# Patient Record
Sex: FEMALE | Race: WHITE | NOT HISPANIC OR LATINO | Employment: OTHER | URBAN - METROPOLITAN AREA
[De-identification: names, ages, dates, MRNs, and addresses within clinical notes are randomized per-mention and may not be internally consistent; named-entity substitution may affect disease eponyms.]

---

## 2019-09-14 ENCOUNTER — ANESTHESIA (EMERGENCY)
Dept: PERIOP | Facility: HOSPITAL | Age: 54
DRG: 661 | End: 2019-09-14
Payer: COMMERCIAL

## 2019-09-14 ENCOUNTER — APPOINTMENT (EMERGENCY)
Dept: CT IMAGING | Facility: HOSPITAL | Age: 54
DRG: 661 | End: 2019-09-14
Payer: COMMERCIAL

## 2019-09-14 ENCOUNTER — ANESTHESIA EVENT (EMERGENCY)
Dept: PERIOP | Facility: HOSPITAL | Age: 54
DRG: 661 | End: 2019-09-14
Payer: COMMERCIAL

## 2019-09-14 ENCOUNTER — HOSPITAL ENCOUNTER (INPATIENT)
Facility: HOSPITAL | Age: 54
LOS: 2 days | Discharge: HOME | DRG: 661 | End: 2019-09-16
Attending: EMERGENCY MEDICINE | Admitting: STUDENT IN AN ORGANIZED HEALTH CARE EDUCATION/TRAINING PROGRAM
Payer: COMMERCIAL

## 2019-09-14 ENCOUNTER — APPOINTMENT (EMERGENCY)
Dept: RADIOLOGY | Facility: HOSPITAL | Age: 54
DRG: 661 | End: 2019-09-14
Payer: COMMERCIAL

## 2019-09-14 ENCOUNTER — TELEPHONE (OUTPATIENT)
Dept: UROLOGY | Facility: CLINIC | Age: 54
End: 2019-09-14

## 2019-09-14 DIAGNOSIS — N20.1 URETEROLITHIASIS: ICD-10-CM

## 2019-09-14 DIAGNOSIS — N12 PYELONEPHRITIS: Primary | ICD-10-CM

## 2019-09-14 PROBLEM — I48.91 ATRIAL FIBRILLATION (HCC): Status: ACTIVE | Noted: 2019-09-14

## 2019-09-14 PROBLEM — N13.2 HYDRONEPHROSIS CONCURRENT WITH AND DUE TO CALCULI OF KIDNEY AND URETER: Status: ACTIVE | Noted: 2019-09-14

## 2019-09-14 PROBLEM — I10 HYPERTENSION: Status: ACTIVE | Noted: 2019-09-14

## 2019-09-14 PROBLEM — I50.9 CHF (CONGESTIVE HEART FAILURE) (HCC): Status: ACTIVE | Noted: 2019-09-14

## 2019-09-14 PROBLEM — E11.9 TYPE 2 DIABETES MELLITUS, WITHOUT LONG-TERM CURRENT USE OF INSULIN (HCC): Status: ACTIVE | Noted: 2019-09-14

## 2019-09-14 LAB
ALBUMIN SERPL BCP-MCNC: 3 G/DL (ref 3.5–5)
ALP SERPL-CCNC: 54 U/L (ref 46–116)
ALT SERPL W P-5'-P-CCNC: 15 U/L (ref 12–78)
ANION GAP SERPL CALCULATED.3IONS-SCNC: 11 MMOL/L (ref 4–13)
ANION GAP SERPL CALCULATED.3IONS-SCNC: 8 MMOL/L (ref 4–13)
ANION GAP SERPL CALCULATED.3IONS-SCNC: 9 MMOL/L (ref 4–13)
APTT PPP: 24 SECONDS (ref 23–37)
AST SERPL W P-5'-P-CCNC: 10 U/L (ref 5–45)
BACTERIA UR QL AUTO: ABNORMAL /HPF
BASOPHILS # BLD AUTO: 0.02 THOUSANDS/ΜL (ref 0–0.1)
BASOPHILS NFR BLD AUTO: 0 % (ref 0–1)
BILIRUB SERPL-MCNC: 0.7 MG/DL (ref 0.2–1)
BILIRUB UR QL STRIP: ABNORMAL
BUN SERPL-MCNC: 11 MG/DL (ref 5–25)
BUN SERPL-MCNC: 12 MG/DL (ref 5–25)
BUN SERPL-MCNC: 15 MG/DL (ref 5–25)
CALCIUM SERPL-MCNC: 8.6 MG/DL (ref 8.3–10.1)
CALCIUM SERPL-MCNC: 8.8 MG/DL (ref 8.3–10.1)
CALCIUM SERPL-MCNC: 9.3 MG/DL (ref 8.3–10.1)
CHLORIDE SERPL-SCNC: 100 MMOL/L (ref 100–108)
CHLORIDE SERPL-SCNC: 101 MMOL/L (ref 100–108)
CHLORIDE SERPL-SCNC: 96 MMOL/L (ref 100–108)
CLARITY UR: ABNORMAL
CO2 SERPL-SCNC: 26 MMOL/L (ref 21–32)
CO2 SERPL-SCNC: 27 MMOL/L (ref 21–32)
CO2 SERPL-SCNC: 28 MMOL/L (ref 21–32)
COLOR UR: YELLOW
CREAT SERPL-MCNC: 0.92 MG/DL (ref 0.6–1.3)
CREAT SERPL-MCNC: 1.09 MG/DL (ref 0.6–1.3)
CREAT SERPL-MCNC: 1.21 MG/DL (ref 0.6–1.3)
EOSINOPHIL # BLD AUTO: 0.18 THOUSAND/ΜL (ref 0–0.61)
EOSINOPHIL NFR BLD AUTO: 2 % (ref 0–6)
ERYTHROCYTE [DISTWIDTH] IN BLOOD BY AUTOMATED COUNT: 12.3 % (ref 11.6–15.1)
GFR SERPL CREATININE-BSD FRML MDRD: 50 ML/MIN/1.73SQ M
GFR SERPL CREATININE-BSD FRML MDRD: 57 ML/MIN/1.73SQ M
GFR SERPL CREATININE-BSD FRML MDRD: 70 ML/MIN/1.73SQ M
GLUCOSE SERPL-MCNC: 108 MG/DL (ref 65–140)
GLUCOSE SERPL-MCNC: 156 MG/DL (ref 65–140)
GLUCOSE SERPL-MCNC: 163 MG/DL (ref 65–140)
GLUCOSE SERPL-MCNC: 165 MG/DL (ref 65–140)
GLUCOSE SERPL-MCNC: 174 MG/DL (ref 65–140)
GLUCOSE SERPL-MCNC: 93 MG/DL (ref 65–140)
GLUCOSE SERPL-MCNC: 98 MG/DL (ref 65–140)
GLUCOSE UR STRIP-MCNC: NEGATIVE MG/DL
HCT VFR BLD AUTO: 41.3 % (ref 34.8–46.1)
HGB BLD-MCNC: 13.8 G/DL (ref 11.5–15.4)
HGB UR QL STRIP.AUTO: ABNORMAL
IMM GRANULOCYTES # BLD AUTO: 0.03 THOUSAND/UL (ref 0–0.2)
IMM GRANULOCYTES NFR BLD AUTO: 0 % (ref 0–2)
INR PPP: 1.13 (ref 0.84–1.19)
KETONES UR STRIP-MCNC: NEGATIVE MG/DL
LACTATE SERPL-SCNC: 1.7 MMOL/L (ref 0.5–2)
LEUKOCYTE ESTERASE UR QL STRIP: ABNORMAL
LYMPHOCYTES # BLD AUTO: 0.68 THOUSANDS/ΜL (ref 0.6–4.47)
LYMPHOCYTES NFR BLD AUTO: 7 % (ref 14–44)
MCH RBC QN AUTO: 30.7 PG (ref 26.8–34.3)
MCHC RBC AUTO-ENTMCNC: 33.4 G/DL (ref 31.4–37.4)
MCV RBC AUTO: 92 FL (ref 82–98)
MONOCYTES # BLD AUTO: 0.43 THOUSAND/ΜL (ref 0.17–1.22)
MONOCYTES NFR BLD AUTO: 4 % (ref 4–12)
NEUTROPHILS # BLD AUTO: 8.35 THOUSANDS/ΜL (ref 1.85–7.62)
NEUTS SEG NFR BLD AUTO: 87 % (ref 43–75)
NITRITE UR QL STRIP: NEGATIVE
NON-SQ EPI CELLS URNS QL MICRO: ABNORMAL /HPF
NRBC BLD AUTO-RTO: 0 /100 WBCS
PH UR STRIP.AUTO: 6 [PH]
PLATELET # BLD AUTO: 190 THOUSANDS/UL (ref 149–390)
PLATELET # BLD AUTO: 240 THOUSANDS/UL (ref 149–390)
PMV BLD AUTO: 9.4 FL (ref 8.9–12.7)
PMV BLD AUTO: 9.7 FL (ref 8.9–12.7)
POTASSIUM SERPL-SCNC: 3 MMOL/L (ref 3.5–5.3)
POTASSIUM SERPL-SCNC: 3.3 MMOL/L (ref 3.5–5.3)
POTASSIUM SERPL-SCNC: 3.5 MMOL/L (ref 3.5–5.3)
PROT SERPL-MCNC: 7.2 G/DL (ref 6.4–8.2)
PROT UR STRIP-MCNC: ABNORMAL MG/DL
PROTHROMBIN TIME: 14.5 SECONDS (ref 11.6–14.5)
RBC # BLD AUTO: 4.49 MILLION/UL (ref 3.81–5.12)
RBC #/AREA URNS AUTO: ABNORMAL /HPF
SODIUM SERPL-SCNC: 134 MMOL/L (ref 136–145)
SODIUM SERPL-SCNC: 135 MMOL/L (ref 136–145)
SODIUM SERPL-SCNC: 137 MMOL/L (ref 136–145)
SP GR UR STRIP.AUTO: >=1.03 (ref 1–1.03)
UROBILINOGEN UR QL STRIP.AUTO: 2 E.U./DL
WBC # BLD AUTO: 9.69 THOUSAND/UL (ref 4.31–10.16)
WBC #/AREA URNS AUTO: ABNORMAL /HPF

## 2019-09-14 PROCEDURE — 87186 SC STD MICRODIL/AGAR DIL: CPT | Performed by: EMERGENCY MEDICINE

## 2019-09-14 PROCEDURE — 87086 URINE CULTURE/COLONY COUNT: CPT | Performed by: UROLOGY

## 2019-09-14 PROCEDURE — 82948 REAGENT STRIP/BLOOD GLUCOSE: CPT

## 2019-09-14 PROCEDURE — 0T778DZ DILATION OF LEFT URETER WITH INTRALUMINAL DEVICE, VIA NATURAL OR ARTIFICIAL OPENING ENDOSCOPIC: ICD-10-PCS | Performed by: UROLOGY

## 2019-09-14 PROCEDURE — 83605 ASSAY OF LACTIC ACID: CPT | Performed by: EMERGENCY MEDICINE

## 2019-09-14 PROCEDURE — 52332 CYSTOSCOPY AND TREATMENT: CPT | Performed by: UROLOGY

## 2019-09-14 PROCEDURE — 87086 URINE CULTURE/COLONY COUNT: CPT | Performed by: EMERGENCY MEDICINE

## 2019-09-14 PROCEDURE — 96375 TX/PRO/DX INJ NEW DRUG ADDON: CPT

## 2019-09-14 PROCEDURE — 74420 UROGRAPHY RTRGR +-KUB: CPT

## 2019-09-14 PROCEDURE — 87040 BLOOD CULTURE FOR BACTERIA: CPT | Performed by: EMERGENCY MEDICINE

## 2019-09-14 PROCEDURE — 85610 PROTHROMBIN TIME: CPT | Performed by: EMERGENCY MEDICINE

## 2019-09-14 PROCEDURE — 85049 AUTOMATED PLATELET COUNT: CPT | Performed by: INTERNAL MEDICINE

## 2019-09-14 PROCEDURE — 85730 THROMBOPLASTIN TIME PARTIAL: CPT | Performed by: EMERGENCY MEDICINE

## 2019-09-14 PROCEDURE — 81001 URINALYSIS AUTO W/SCOPE: CPT | Performed by: EMERGENCY MEDICINE

## 2019-09-14 PROCEDURE — 87077 CULTURE AEROBIC IDENTIFY: CPT | Performed by: EMERGENCY MEDICINE

## 2019-09-14 PROCEDURE — 99285 EMERGENCY DEPT VISIT HI MDM: CPT

## 2019-09-14 PROCEDURE — 96361 HYDRATE IV INFUSION ADD-ON: CPT

## 2019-09-14 PROCEDURE — 80053 COMPREHEN METABOLIC PANEL: CPT | Performed by: EMERGENCY MEDICINE

## 2019-09-14 PROCEDURE — BT1F1ZZ FLUOROSCOPY OF LEFT KIDNEY, URETER AND BLADDER USING LOW OSMOLAR CONTRAST: ICD-10-PCS | Performed by: RADIOLOGY

## 2019-09-14 PROCEDURE — 87077 CULTURE AEROBIC IDENTIFY: CPT | Performed by: UROLOGY

## 2019-09-14 PROCEDURE — 99222 1ST HOSP IP/OBS MODERATE 55: CPT | Performed by: UROLOGY

## 2019-09-14 PROCEDURE — 36415 COLL VENOUS BLD VENIPUNCTURE: CPT | Performed by: EMERGENCY MEDICINE

## 2019-09-14 PROCEDURE — C2617 STENT, NON-COR, TEM W/O DEL: HCPCS | Performed by: UROLOGY

## 2019-09-14 PROCEDURE — 80048 BASIC METABOLIC PNL TOTAL CA: CPT | Performed by: INTERNAL MEDICINE

## 2019-09-14 PROCEDURE — 87186 SC STD MICRODIL/AGAR DIL: CPT | Performed by: UROLOGY

## 2019-09-14 PROCEDURE — 96374 THER/PROPH/DIAG INJ IV PUSH: CPT

## 2019-09-14 PROCEDURE — 85025 COMPLETE CBC W/AUTO DIFF WBC: CPT | Performed by: EMERGENCY MEDICINE

## 2019-09-14 PROCEDURE — 93005 ELECTROCARDIOGRAM TRACING: CPT

## 2019-09-14 PROCEDURE — 99284 EMERGENCY DEPT VISIT MOD MDM: CPT | Performed by: EMERGENCY MEDICINE

## 2019-09-14 PROCEDURE — C1769 GUIDE WIRE: HCPCS | Performed by: UROLOGY

## 2019-09-14 PROCEDURE — 99223 1ST HOSP IP/OBS HIGH 75: CPT | Performed by: INTERNAL MEDICINE

## 2019-09-14 PROCEDURE — 74177 CT ABD & PELVIS W/CONTRAST: CPT

## 2019-09-14 DEVICE — INLAY OPTIMA URETERAL STENT W/O GUIDEWIRE
Type: IMPLANTABLE DEVICE | Site: URETER | Status: FUNCTIONAL
Brand: BARD® INLAY OPTIMA® URETERAL STENT

## 2019-09-14 RX ORDER — ICOSAPENT ETHYL 1000 MG/1
1 CAPSULE ORAL 2 TIMES DAILY
COMMUNITY
Start: 2019-06-28

## 2019-09-14 RX ORDER — FENTANYL CITRATE/PF 50 MCG/ML
50 SYRINGE (ML) INJECTION
Status: DISCONTINUED | OUTPATIENT
Start: 2019-09-14 | End: 2019-09-14 | Stop reason: HOSPADM

## 2019-09-14 RX ORDER — TRAMADOL HYDROCHLORIDE 50 MG/1
50 TABLET ORAL EVERY 6 HOURS PRN
COMMUNITY

## 2019-09-14 RX ORDER — ALBUTEROL SULFATE 90 UG/1
AEROSOL, METERED RESPIRATORY (INHALATION) AS NEEDED
Status: DISCONTINUED | OUTPATIENT
Start: 2019-09-14 | End: 2019-09-14 | Stop reason: SURG

## 2019-09-14 RX ORDER — ASPIRIN 81 MG/1
81 TABLET, CHEWABLE ORAL DAILY
COMMUNITY

## 2019-09-14 RX ORDER — MAGNESIUM HYDROXIDE 1200 MG/15ML
LIQUID ORAL AS NEEDED
Status: DISCONTINUED | OUTPATIENT
Start: 2019-09-14 | End: 2019-09-14 | Stop reason: HOSPADM

## 2019-09-14 RX ORDER — MIDAZOLAM HYDROCHLORIDE 1 MG/ML
INJECTION INTRAMUSCULAR; INTRAVENOUS AS NEEDED
Status: DISCONTINUED | OUTPATIENT
Start: 2019-09-14 | End: 2019-09-14 | Stop reason: SURG

## 2019-09-14 RX ORDER — PROPOFOL 10 MG/ML
INJECTION, EMULSION INTRAVENOUS AS NEEDED
Status: DISCONTINUED | OUTPATIENT
Start: 2019-09-14 | End: 2019-09-14 | Stop reason: SURG

## 2019-09-14 RX ORDER — ESMOLOL HYDROCHLORIDE 10 MG/ML
INJECTION INTRAVENOUS AS NEEDED
Status: DISCONTINUED | OUTPATIENT
Start: 2019-09-14 | End: 2019-09-14 | Stop reason: SURG

## 2019-09-14 RX ORDER — SODIUM CHLORIDE 9 MG/ML
75 INJECTION, SOLUTION INTRAVENOUS CONTINUOUS
Status: DISCONTINUED | OUTPATIENT
Start: 2019-09-14 | End: 2019-09-15

## 2019-09-14 RX ORDER — FENTANYL CITRATE 50 UG/ML
INJECTION, SOLUTION INTRAMUSCULAR; INTRAVENOUS AS NEEDED
Status: DISCONTINUED | OUTPATIENT
Start: 2019-09-14 | End: 2019-09-14 | Stop reason: SURG

## 2019-09-14 RX ORDER — VALACYCLOVIR HYDROCHLORIDE 1 G/1
1000 TABLET, FILM COATED ORAL 2 TIMES DAILY
COMMUNITY

## 2019-09-14 RX ORDER — ALBUMIN, HUMAN INJ 5% 5 %
25 SOLUTION INTRAVENOUS ONCE
Status: COMPLETED | OUTPATIENT
Start: 2019-09-14 | End: 2019-09-14

## 2019-09-14 RX ORDER — FENOFIBRATE 134 MG/1
134 CAPSULE ORAL
COMMUNITY

## 2019-09-14 RX ORDER — LOSARTAN POTASSIUM 100 MG/1
100 TABLET ORAL DAILY
COMMUNITY

## 2019-09-14 RX ORDER — LANOLIN ALCOHOL/MO/W.PET/CERES
1000 CREAM (GRAM) TOPICAL DAILY
Status: ON HOLD | COMMUNITY
End: 2019-11-14 | Stop reason: ALTCHOICE

## 2019-09-14 RX ORDER — PANTOPRAZOLE SODIUM 40 MG/1
40 TABLET, DELAYED RELEASE ORAL DAILY
COMMUNITY

## 2019-09-14 RX ORDER — ALBUTEROL SULFATE 2.5 MG/3ML
2.5 SOLUTION RESPIRATORY (INHALATION) ONCE
Status: COMPLETED | OUTPATIENT
Start: 2019-09-14 | End: 2019-09-14

## 2019-09-14 RX ORDER — CARVEDILOL 6.25 MG/1
6.25 TABLET ORAL 2 TIMES DAILY WITH MEALS
Status: DISCONTINUED | OUTPATIENT
Start: 2019-09-14 | End: 2019-09-16 | Stop reason: HOSPADM

## 2019-09-14 RX ORDER — SODIUM CHLORIDE, SODIUM LACTATE, POTASSIUM CHLORIDE, CALCIUM CHLORIDE 600; 310; 30; 20 MG/100ML; MG/100ML; MG/100ML; MG/100ML
75 INJECTION, SOLUTION INTRAVENOUS CONTINUOUS
Status: DISCONTINUED | OUTPATIENT
Start: 2019-09-14 | End: 2019-09-14

## 2019-09-14 RX ORDER — POTASSIUM CHLORIDE 20 MEQ/1
40 TABLET, EXTENDED RELEASE ORAL ONCE
Status: COMPLETED | OUTPATIENT
Start: 2019-09-14 | End: 2019-09-14

## 2019-09-14 RX ORDER — ASPIRIN 81 MG/1
81 TABLET, CHEWABLE ORAL DAILY
Status: DISCONTINUED | OUTPATIENT
Start: 2019-09-14 | End: 2019-09-16 | Stop reason: HOSPADM

## 2019-09-14 RX ORDER — SPIRONOLACTONE 25 MG/1
25 TABLET ORAL DAILY
Status: DISCONTINUED | OUTPATIENT
Start: 2019-09-14 | End: 2019-09-16 | Stop reason: HOSPADM

## 2019-09-14 RX ORDER — ATORVASTATIN CALCIUM 10 MG/1
10 TABLET, FILM COATED ORAL DAILY
COMMUNITY

## 2019-09-14 RX ORDER — ACETAMINOPHEN 325 MG/1
650 TABLET ORAL EVERY 6 HOURS PRN
Status: DISCONTINUED | OUTPATIENT
Start: 2019-09-14 | End: 2019-09-16 | Stop reason: HOSPADM

## 2019-09-14 RX ORDER — ATORVASTATIN CALCIUM 10 MG/1
10 TABLET, FILM COATED ORAL DAILY
Status: DISCONTINUED | OUTPATIENT
Start: 2019-09-14 | End: 2019-09-16 | Stop reason: HOSPADM

## 2019-09-14 RX ORDER — ONDANSETRON 2 MG/ML
4 INJECTION INTRAMUSCULAR; INTRAVENOUS ONCE AS NEEDED
Status: DISCONTINUED | OUTPATIENT
Start: 2019-09-14 | End: 2019-09-14 | Stop reason: HOSPADM

## 2019-09-14 RX ORDER — HEPARIN SODIUM 5000 [USP'U]/ML
5000 INJECTION, SOLUTION INTRAVENOUS; SUBCUTANEOUS EVERY 8 HOURS SCHEDULED
Status: DISCONTINUED | OUTPATIENT
Start: 2019-09-14 | End: 2019-09-16 | Stop reason: HOSPADM

## 2019-09-14 RX ORDER — POTASSIUM CHLORIDE 14.9 MG/ML
INJECTION INTRAVENOUS CONTINUOUS PRN
Status: DISCONTINUED | OUTPATIENT
Start: 2019-09-14 | End: 2019-09-14 | Stop reason: SURG

## 2019-09-14 RX ORDER — LIDOCAINE HYDROCHLORIDE 20 MG/ML
INJECTION, SOLUTION EPIDURAL; INFILTRATION; INTRACAUDAL; PERINEURAL AS NEEDED
Status: DISCONTINUED | OUTPATIENT
Start: 2019-09-14 | End: 2019-09-14 | Stop reason: SURG

## 2019-09-14 RX ORDER — CARVEDILOL 6.25 MG/1
6.25 TABLET ORAL
COMMUNITY

## 2019-09-14 RX ORDER — FENOFIBRATE 145 MG/1
145 TABLET, COATED ORAL DAILY
Status: DISCONTINUED | OUTPATIENT
Start: 2019-09-14 | End: 2019-09-16 | Stop reason: HOSPADM

## 2019-09-14 RX ORDER — ONDANSETRON 2 MG/ML
INJECTION INTRAMUSCULAR; INTRAVENOUS AS NEEDED
Status: DISCONTINUED | OUTPATIENT
Start: 2019-09-14 | End: 2019-09-14 | Stop reason: SURG

## 2019-09-14 RX ORDER — HYDROMORPHONE HCL/PF 1 MG/ML
0.5 SYRINGE (ML) INJECTION ONCE
Status: COMPLETED | OUTPATIENT
Start: 2019-09-14 | End: 2019-09-14

## 2019-09-14 RX ORDER — LOSARTAN POTASSIUM 50 MG/1
100 TABLET ORAL DAILY
Status: DISCONTINUED | OUTPATIENT
Start: 2019-09-14 | End: 2019-09-16 | Stop reason: HOSPADM

## 2019-09-14 RX ORDER — FUROSEMIDE 40 MG/1
40 TABLET ORAL DAILY
Status: DISCONTINUED | OUTPATIENT
Start: 2019-09-14 | End: 2019-09-16 | Stop reason: HOSPADM

## 2019-09-14 RX ORDER — SODIUM CHLORIDE, SODIUM LACTATE, POTASSIUM CHLORIDE, CALCIUM CHLORIDE 600; 310; 30; 20 MG/100ML; MG/100ML; MG/100ML; MG/100ML
INJECTION, SOLUTION INTRAVENOUS CONTINUOUS PRN
Status: DISCONTINUED | OUTPATIENT
Start: 2019-09-14 | End: 2019-09-14 | Stop reason: SURG

## 2019-09-14 RX ORDER — PANTOPRAZOLE SODIUM 40 MG/1
40 TABLET, DELAYED RELEASE ORAL DAILY
Status: DISCONTINUED | OUTPATIENT
Start: 2019-09-14 | End: 2019-09-16 | Stop reason: HOSPADM

## 2019-09-14 RX ORDER — FUROSEMIDE 40 MG/1
TABLET ORAL
COMMUNITY
Start: 2019-06-17

## 2019-09-14 RX ORDER — SPIRONOLACTONE 25 MG/1
TABLET ORAL
COMMUNITY
Start: 2019-06-17

## 2019-09-14 RX ADMIN — NICOTINE 7 MG: 7 PATCH TRANSDERMAL at 13:08

## 2019-09-14 RX ADMIN — SODIUM CHLORIDE 1000 ML: 0.9 INJECTION, SOLUTION INTRAVENOUS at 07:42

## 2019-09-14 RX ADMIN — SPIRONOLACTONE 25 MG: 25 TABLET ORAL at 13:04

## 2019-09-14 RX ADMIN — HEPARIN SODIUM 5000 UNITS: 5000 INJECTION INTRAVENOUS; SUBCUTANEOUS at 14:41

## 2019-09-14 RX ADMIN — ATORVASTATIN CALCIUM 10 MG: 10 TABLET, FILM COATED ORAL at 13:03

## 2019-09-14 RX ADMIN — MIDAZOLAM HYDROCHLORIDE 2 MG: 1 INJECTION, SOLUTION INTRAMUSCULAR; INTRAVENOUS at 09:31

## 2019-09-14 RX ADMIN — HEPARIN SODIUM 5000 UNITS: 5000 INJECTION INTRAVENOUS; SUBCUTANEOUS at 21:03

## 2019-09-14 RX ADMIN — ONDANSETRON 4 MG: 2 INJECTION INTRAMUSCULAR; INTRAVENOUS at 09:38

## 2019-09-14 RX ADMIN — ESMOLOL HYDROCHLORIDE 10 MG: 10 INJECTION, SOLUTION INTRAVENOUS at 09:42

## 2019-09-14 RX ADMIN — POTASSIUM CHLORIDE: 200 INJECTION, SOLUTION INTRAVENOUS at 09:33

## 2019-09-14 RX ADMIN — HYDROMORPHONE HYDROCHLORIDE 0.5 MG: 1 INJECTION, SOLUTION INTRAMUSCULAR; INTRAVENOUS; SUBCUTANEOUS at 07:43

## 2019-09-14 RX ADMIN — CEFEPIME HYDROCHLORIDE 2000 MG: 2 INJECTION, POWDER, FOR SOLUTION INTRAVENOUS at 19:39

## 2019-09-14 RX ADMIN — PHENYLEPHRINE HYDROCHLORIDE 100 MCG: 10 INJECTION INTRAVENOUS at 09:44

## 2019-09-14 RX ADMIN — SODIUM CHLORIDE, SODIUM LACTATE, POTASSIUM CHLORIDE, AND CALCIUM CHLORIDE 75 ML/HR: .6; .31; .03; .02 INJECTION, SOLUTION INTRAVENOUS at 11:24

## 2019-09-14 RX ADMIN — SODIUM CHLORIDE, SODIUM LACTATE, POTASSIUM CHLORIDE, AND CALCIUM CHLORIDE: .6; .31; .03; .02 INJECTION, SOLUTION INTRAVENOUS at 09:25

## 2019-09-14 RX ADMIN — PHENYLEPHRINE HYDROCHLORIDE 100 MCG: 10 INJECTION INTRAVENOUS at 09:38

## 2019-09-14 RX ADMIN — LOSARTAN POTASSIUM 100 MG: 50 TABLET, FILM COATED ORAL at 13:03

## 2019-09-14 RX ADMIN — IOHEXOL 100 ML: 350 INJECTION, SOLUTION INTRAVENOUS at 08:02

## 2019-09-14 RX ADMIN — ALBUMIN (HUMAN) 25 G: 12.5 SOLUTION INTRAVENOUS at 10:29

## 2019-09-14 RX ADMIN — FUROSEMIDE 40 MG: 40 TABLET ORAL at 13:04

## 2019-09-14 RX ADMIN — ASPIRIN 81 MG 81 MG: 81 TABLET ORAL at 13:04

## 2019-09-14 RX ADMIN — SODIUM CHLORIDE 75 ML/HR: 0.9 INJECTION, SOLUTION INTRAVENOUS at 14:07

## 2019-09-14 RX ADMIN — CEFEPIME 2000 MG: 2 INJECTION, POWDER, FOR SOLUTION INTRAVENOUS at 08:39

## 2019-09-14 RX ADMIN — PROPOFOL 100 MG: 10 INJECTION, EMULSION INTRAVENOUS at 09:38

## 2019-09-14 RX ADMIN — LIDOCAINE HYDROCHLORIDE 100 MG: 20 INJECTION, SOLUTION EPIDURAL; INFILTRATION; INTRACAUDAL; PERINEURAL at 09:38

## 2019-09-14 RX ADMIN — PHENYLEPHRINE HYDROCHLORIDE 200 MCG: 10 INJECTION INTRAVENOUS at 09:49

## 2019-09-14 RX ADMIN — PANTOPRAZOLE SODIUM 40 MG: 40 TABLET, DELAYED RELEASE ORAL at 13:04

## 2019-09-14 RX ADMIN — FENOFIBRATE 145 MG: 145 TABLET, COATED ORAL at 13:04

## 2019-09-14 RX ADMIN — FENTANYL CITRATE 25 MCG: 50 INJECTION, SOLUTION INTRAMUSCULAR; INTRAVENOUS at 09:41

## 2019-09-14 RX ADMIN — MORPHINE SULFATE 2 MG: 2 INJECTION, SOLUTION INTRAMUSCULAR; INTRAVENOUS at 17:25

## 2019-09-14 RX ADMIN — POTASSIUM CHLORIDE 40 MEQ: 1500 TABLET, EXTENDED RELEASE ORAL at 17:35

## 2019-09-14 RX ADMIN — ALBUTEROL SULFATE 2 PUFF: 90 AEROSOL, METERED RESPIRATORY (INHALATION) at 09:31

## 2019-09-14 RX ADMIN — ALBUTEROL SULFATE 2.5 MG: 2.5 SOLUTION RESPIRATORY (INHALATION) at 10:14

## 2019-09-14 NOTE — ANESTHESIA PREPROCEDURE EVALUATION
Review of Systems/Medical History  Patient summary reviewed  Chart reviewed  No history of anesthetic complications     Cardiovascular  EKG reviewed, Exercise tolerance (METS): >4,  Pacemaker/AICD (PPM/AICD  Last device check 7/18/19), Hypertension , Dysrhythmias (did not take beta blocker today  Patient reports hx of Afib but does not take systemic AC and is in NSR on EKG in preop holding) , atrial fibrillation, CHF (Admitted with CHF exacerbation 2012, LVEF 20-25%, clean coronaries on cath at that time  Follows with Dr Diaz Polanco for [facility]ology at [facility]) , No orthopnea, No PND, No DRAKE,    Pulmonary  Smoker , Tobacco cessation counseling given , COPD mild- PRN medication , No recent URI ,   Comment: PFTs 2015:  Result Narrative  The patient is a 42-year-old female current smoker   Lung volumes show the total lung capacity and vital capacity are normal   Spirometry shows good effort   Forced vital capacity is normal   Flow rates are decreased   After inhaled bronchodilator there is no significant change in FVC or FEV1   Diffusing capacity is decreased   The patient has mild obstructive airways disease  GI/Hepatic    No GERD ,   Comment: Confirmed NPO appropriate     Kidney stones (large obstructing left sided kidney stone),   Comment: Pyelonephritis     Endo/Other  Diabetes type 2 ,   Comment: Recent herpes zoster    GYN      Comment: Hx of vulvar CA s/p surgical resection and radiation     Hematology  Negative hematology ROS      Musculoskeletal  Negative musculoskeletal ROS        Neurology  Negative neurology ROS   No diabetic neuropathy,    Psychology   Negative psychology ROS              Physical Exam    Airway    Mallampati score: II  TM Distance: >3 FB  Neck ROM: full     Dental   upper dentures,     Cardiovascular  Rhythm: regular, Rate: normal,     Pulmonary  Breath sounds clear to auscultation,     Other Findings        Anesthesia Plan  ASA Score- 3     Anesthesia Type- general with ASA Monitors  Additional Monitors:   Airway Plan: LMA  Comment: If stenting unsuccessful and patient requires nephrostomy, will do so under one anesthetic with conversion to ETT prior to undergoing nephrostomy in prone position  Plan Factors-    Induction- intravenous  Postoperative Plan- Plan for postoperative opioid use  Planned trial extubation    Informed Consent- Anesthetic plan and risks discussed with patient  I personally reviewed this patient with the CRNA  Discussed and agreed on the Anesthesia Plan with the CRNA           Lab Results   Component Value Date    WBC 9 69 09/14/2019    HGB 13 8 09/14/2019    HCT 41 3 09/14/2019    MCV 92 09/14/2019     09/14/2019     Lab Results   Component Value Date    SODIUM 134 (L) 09/14/2019    K 3 3 (L) 09/14/2019    CL 96 (L) 09/14/2019    CO2 27 09/14/2019    BUN 15 09/14/2019    CREATININE 1 21 09/14/2019    GLUC 163 (H) 09/14/2019    CALCIUM 9 3 09/14/2019     Lab Results   Component Value Date    ALT 15 09/14/2019    AST 10 09/14/2019    ALKPHOS 54 09/14/2019     Lab Results   Component Value Date    INR 1 13 09/14/2019    PROTIME 14 5 09/14/2019     No results found for: HGBA1C

## 2019-09-14 NOTE — ED PROVIDER NOTES
History  Chief Complaint   Patient presents with    Abdominal Pain     Pt presents to the ED with LLQ abdominal pain that radiates to her left flank with difficulty urinating, and polyuria  Pt reported her symptoms began a week ago  HPI  47-year-old female with history of AFib, diabetes, hypertension, hypertriglyceridemia, and vulvar cancer status post vulvectomy and flap reconstruction presents to the emergency department with complaint of abdominal pain  Patient states that she has had suprapubic and left lower quadrant abdominal pain for the past 2 weeks  Initially pain was intermittent, but over the past week pain has been constant and worsening  She reports radiation from left lower quadrant around her lower back and into her left flank  She states that pain has been exacerbated by eating and using the bathroom  When pain first began she was taking Tramadol for shingles (under left breast), though Tramadol did not seem to help abdominal pain and caused her to feel constipated  Since stopping Tramadol she has been able to move her bowels, but pain has not improved  She denies having had similar pain in the past and states that pain is worse than labor pain  On ROS patient complains of decreased appetite appetite and difficulty urinating  She does endorse chronic difficulty urinating since undergoing vulvectomy  She denies noted dysuria or hematuria  ROS also negative for fevers, chills, chest pain, shortness of breath, nausea, vomiting, diarrhea, or complaints other than stated above  She has never undergone colonoscopy  None       Past Medical History:   Diagnosis Date    [facility]     Diabetes mellitus ([facility] )     Hypertension        Past Surgical History:   Procedure Laterality Date    CARDIAC DEFIBRILLATOR PLACEMENT         History reviewed  No pertinent family history  I have reviewed and agree with the history as documented      Social History     Tobacco Use    Smoking status: Current Every Day Smoker     Packs/day: 0 50    Smokeless tobacco: Never Used   Substance Use Topics    Alcohol use: Never     Frequency: Never    Drug use: Never        Review of Systems   Constitutional: Positive for appetite change  Negative for chills and fever  Respiratory: Negative for shortness of breath  Gastrointestinal: Positive for abdominal pain  Negative for nausea and vomiting  Musculoskeletal: Negative for arthralgias and joint swelling  Skin: Negative for rash and wound  Allergic/Immunologic: Negative for immunocompromised state  Neurological: Negative for headaches  Psychiatric/Behavioral: The patient is not nervous/anxious  All other systems reviewed and are negative  Physical Exam  Physical Exam   Constitutional: She is oriented to person, place, and time  She appears well-nourished  No distress  Mild discomfort secondary to pain  Appears older than stated age   HENT:   Head: Normocephalic and atraumatic  Eyes: EOM are normal    Neck: Normal range of motion  Neck supple  Cardiovascular: Normal rate and regular rhythm  Pulmonary/Chest: Effort normal and breath sounds normal  No respiratory distress  Abdominal: Soft  She exhibits no distension  There is tenderness in the suprapubic area and left lower quadrant  Musculoskeletal: Normal range of motion  Neurological: She is alert and oriented to person, place, and time  Skin: Skin is warm and dry  She is not diaphoretic  Psychiatric: She has a normal mood and affect  Her behavior is normal    Nursing note and vitals reviewed        Vital Signs  ED Triage Vitals   Temperature Pulse Respirations Blood Pressure SpO2   09/14/19 0634 09/14/19 0634 09/14/19 0634 09/14/19 0634 09/14/19 0634   98 °F (36 7 °C) 95 20 116/58 98 %      Temp Source Heart Rate Source Patient Position - Orthostatic VS BP Location FiO2 (%)   09/14/19 0634 09/14/19 0634 09/14/19 0847 09/14/19 0634 --   Oral Monitor Lying Right arm       Pain Score       09/14/19 0634       Worst Possible Pain           Vitals:    09/14/19 0634 09/14/19 0745 09/14/19 0847   BP: 116/58 99/64 147/70   Pulse: 95 90 95   Patient Position - Orthostatic VS:   Lying         Visual Acuity      ED Medications  Medications   sodium chloride 0 9 % bolus 1,000 mL (1,000 mL Intravenous New Bag 9/14/19 0742)   HYDROmorphone (DILAUDID) injection 0 5 mg (0 5 mg Intravenous Given 9/14/19 0743)   iohexol (OMNIPAQUE) 350 MG/ML injection (MULTI-DOSE) 100 mL (100 mL Intravenous Given 9/14/19 0802)   cefepime (MAXIPIME) 2,000 mg in dextrose 5 % 50 mL IVPB (0 mg Intravenous Stopped 9/14/19 0848)       Diagnostic Studies  Results Reviewed     Procedure Component Value Units Date/Time    Lactic acid, plasma [[id number]]  (Normal) Collected:  09/14/19 0829    Lab Status:  Final result Specimen:  Blood from Arm, Right Updated:  09/14/19 0901     LACTIC ACID 1 7 mmol/L     Narrative:       Result may be elevated if tourniquet was used during collection  APTT [[id number]]  (Normal) Collected:  09/14/19 0829    Lab Status:  Final result Specimen:  Blood from Arm, Right Updated:  09/14/19 0854     PTT 24 seconds     Protime-INR [[id number]]  (Normal) Collected:  09/14/19 0829    Lab Status:  Final result Specimen:  Blood from Arm, Right Updated:  09/14/19 0854     Protime 14 5 seconds      INR 1 13    Blood culture [[id number]] Collected:  09/14/19 0845    Lab Status: In process Specimen:  Blood from Arm, Right Updated:  09/14/19 0848    Blood culture [[id number]] Collected:  09/14/19 0829    Lab Status:   In process Specimen:  Blood from Arm, Right Updated:  09/14/19 0839    Urine Microscopic [[id number]]  (Abnormal) Collected:  09/14/19 0739    Lab Status:  Final result Specimen:  Urine, Clean Catch Updated:  09/14/19 0801     RBC, UA 10-20 /hpf      WBC, UA Innumerable /hpf      Epithelial Cells Occasional /hpf      Bacteria, UA Occasional /hpf     Urine culture [[id number]] Collected:  09/14/19 0739 Lab Status:   In process Specimen:  Urine, Clean Catch Updated:  09/14/19 0800    UA w Reflex to Microscopic w Reflex to Culture [[id number]]  (Abnormal) Collected:  09/14/19 0739    Lab Status:  Final result Specimen:  Urine, Clean Catch Updated:  09/14/19 0754     Color, UA Yellow     Clarity, UA Cloudy     Specific Gravity, UA >=1 030     pH, UA 6 0     Leukocytes, UA Moderate     Nitrite, UA Negative     Protein,  (2+) mg/dl      Glucose, UA Negative mg/dl      Ketones, UA Negative mg/dl      Urobilinogen, UA 2 0 E U /dl      Bilirubin, UA Small     Blood, UA Large    Comprehensive metabolic panel [[id number]]  (Abnormal) Collected:  09/14/19 0658    Lab Status:  Final result Specimen:  Blood from Arm, Right Updated:  09/14/19 0719     Sodium 134 mmol/L      Potassium 3 3 mmol/L      Chloride 96 mmol/L      CO2 27 mmol/L      ANION GAP 11 mmol/L      BUN 15 mg/dL      Creatinine 1 21 mg/dL      Glucose 163 mg/dL      Calcium 9 3 mg/dL      AST 10 U/L      ALT 15 U/L      Alkaline Phosphatase 54 U/L      Total Protein 7 2 g/dL      Albumin 3 0 g/dL      Total Bilirubin 0 70 mg/dL      eGFR 50 ml/min/1 73sq m     Narrative:       [place] guidelines for Chronic Kidney Disease (CKD):     Stage 1 with normal or high GFR (GFR > 90 mL/min/1 73 square meters)    Stage 2 Mild CKD (GFR = 60-89 mL/min/1 73 square meters)    Stage 3A Moderate CKD (GFR = 45-59 mL/min/1 73 square meters)    Stage 3B Moderate CKD (GFR = 30-44 mL/min/1 73 square meters)    Stage 4 Severe CKD (GFR = 15-29 mL/min/1 73 square meters)    Stage 5 End Stage CKD (GFR <15 mL/min/1 73 square meters)  Note: GFR calculation is accurate only with a steady state creatinine    CBC and differential [[id number]]  (Abnormal) Collected:  09/14/19 0658    Lab Status:  Final result Specimen:  Blood from Arm, Right Updated:  09/14/19 0706     WBC 9 69 Thousand/uL      RBC 4 49 Million/uL      Hemoglobin 13 8 g/dL Hematocrit 41 3 %      MCV 92 fL      MCH 30 7 pg      MCHC 33 4 g/dL      RDW 12 3 %      MPV 9 4 fL      Platelets 733 Thousands/uL      nRBC 0 /100 WBCs      Neutrophils Relative 87 %      Immat GRANS % 0 %      Lymphocytes Relative 7 %      Monocytes Relative 4 %      Eosinophils Relative 2 %      Basophils Relative 0 %      Neutrophils Absolute 8 35 Thousands/µL      Immature Grans Absolute 0 03 Thousand/uL      Lymphocytes Absolute 0 68 Thousands/µL      Monocytes Absolute 0 43 Thousand/µL      Eosinophils Absolute 0 18 Thousand/µL      Basophils Absolute 0 02 Thousands/µL                  CT abdomen pelvis with contrast   Final Result by Kelly Almanzar DO (09/14 8960)   1  Severely obstructing 10 mm distal left ureteric calculus approximately 6 cm above the ureterovesical junction  2   Enhancement of the left collecting system and ureter, suspicious for pyelonephritis  3   Nonobstructing 9 mm left lower pole renal calculus  4   Fatty infiltrative changes of the liver  The study was marked in [facility] for immediate notification  Workstation performed: [id number]                    Procedures  Procedures       ED Course  ED Course as of Sep 14 0857   Sat Sep 14, 2019   0803 WBC, UA(!): Innumerable   0803 Blood, UA(!): Large   8747 Urology paged                        MDM    Disposition  Final diagnoses:   Pyelonephritis   Ureterolithiasis     Time reflects when diagnosis was documented in both MDM as applicable and the Disposition within this note     Time User Action Codes Description Comment    9/14/2019  8:39 AM Renetta Sarabia [N12] Pyelonephritis     9/14/2019  8:39 AM Renetta Varela Add [N20 1] Ureterolithiasis       ED Disposition     ED Disposition Condition Date/Time Comment    Send to OR  Sat Sep 14, 2019  8:39 AM Dr Rik Carvalho    None         Patient's Medications    No medications on file     No discharge procedures on file      ED Provider  Electronically Signed by           Nannette Martinez MD  09/14/19 9074

## 2019-09-14 NOTE — ASSESSMENT & PLAN NOTE
Patient presented with 2-3 weeks of left sided flank pain associated with generalized weakness and loss of appetite  Symptom worsened in the last few days  UA +ve, Imaging evidence of left severe hydronephrosis 2/2 to distal ureteric stone  JJ stent placed today  Patient on Cefepime, continue broad spectrum antibiotics and switch to appropriate abx with culture result

## 2019-09-14 NOTE — ASSESSMENT & PLAN NOTE
Left severe hydronephrosis secondary to distal ureteric stone  Status post JJ stent placement  Continue in-house urology follow-up

## 2019-09-14 NOTE — TELEPHONE ENCOUNTER
New patient with large infected stone today  Treated with stent    Please schedule preop visit with advanced practitioner in 1-2 weeks to plan for neck surgery    She should have a urine culture obtained that visit and case request placed for her next procedure at [facility]    Left ureteroscopy, large stones, please plan 90 minutes total operative time for her case

## 2019-09-14 NOTE — ED NOTES
Difficulty in obtaining second set of blood cultures, patient expected in the OR ASAP so antibiotic hung without delay     Alida Sagastume RN  09/14/19 7993

## 2019-09-14 NOTE — ED NOTES
Spoke with PACU will transport patient to this area as soon as antibiotic luis alberto Tom RN  09/14/19 0326

## 2019-09-14 NOTE — ANESTHESIA POSTPROCEDURE EVALUATION
Post-Op Assessment Note    CV Status:  Stable  Pain Score: 0    Pain management: adequate     Mental Status:  Awake   Hydration Status:  Stable   PONV Controlled:  None   Airway Patency:  Patent and adequate   Post Op Vitals Reviewed: Yes      Staff: CRNA           /63   Temp   98 5   Pulse  92   Resp 16   SpO2   100%

## 2019-09-14 NOTE — OP NOTE
Operative Note     PATIENT:  Collin Conner (MRN [id number])    DATE OF PROCEDURE:   9/14/2019    PRE-OP DIAGNOSIS:   1) severe hydronephrosis  2) 1 cm obstructing left distal stone  3) 9 mm left lower pole stone  4) urinary tract infection    POST-OP DIAGNOSIS:   1) severe hydronephrosis  2) 1 cm obstructing left distal stone  3) 9 mm left lower pole stone  4) urinary tract infection    PROCEDURES PERFORMED:  1) Cystoscopy  2) left retrograde pyelography with fluoroscopic interpretation  3) left ureteral stent placement (6F x 24cm    SURGEON:  Franca Pennington MD    ASSISTANTS:      NOTE:  There were no qualified teaching residents to assist with this case    ANESTHESIA: General     COMPLICATIONS:   None    ANTIBIOTICS:  Cefepime    INTRAOPERATIVE THROMBOEMBOLISM PROPHYLAXIS:  Pneumatic compression stockings     FINDINGS:    The large left distal calculus was radiopaque  Severe hydroureteronephrosis noted on retrograde pyelogram   The stent was able to be placed successfully to drain the kidney  There was a hydronephrotic drip of frankly purulent urine drained from the kidney  Patient remained hemodynamically stable throughout the procedure and postoperatively  PROCEDURE IN DETAIL:   The patient was identified and brought to the OR  Antibiotic prophylaxis and DVT prophylaxis were administered  They were placed in the comfortable dorsal lithotomy position with care to pad all pressure points  They were prepped and draped in the usual sterile fashion using hibiclens  A surgical time out was performed with all in the room in agreement with the correct patient, procedure, indications, and laterality  Exam under anesthesia reveals findings consistent with prior vaginal reconstruction particularly of the posterior compartment  The urethral meatus is normal   A 21-[place] rigid cystoscope was used to enter the bladder  The bladder was inspected in its entirety and there were no lesions noted    The ureteral orifices were identified in their normal orthotopic positions  The left ureteral orifice was identified and a 5 Fr open ended catheter was placed into the ureteral orifice  The stone was visible on  fluoroscopic guidance  A retrograde pyelogram was performed with injection of 50/50 Isovue which demonstrated  severe hydroureteronephrosis  A Sensor wire was up to the kidney under fluoroscopic guidance  Daiva Foot stent was then passed up the wire  under fluoroscopic guidance into the left  kidney with a good curl noted in the kidney and in the bladder  The bladder was drained  The patient was placed back supine, awakened from general anesthesia and brought to recovery room in stable condition  SPECIMENS:   Order Name Source Comment Collection Info Order Time   URINE CULTURE Kidney, Left  Collected By: Mayela Strong MD 9/14/2019  9:50 AM        IMPLANTS:   Implant Name Type Inv  Item Serial No   Lot No  LRB No  Used   URETERAL STENT 6 FR X 24 CM OPTIMA INLAY - [id number]  URETERAL STENT 6 FR X 24 CM OPTIMA INLAY  [place] [employer] [id number] Left 1        COMPLICATIONS: none    DISPOSITION: PACU     PLAN:   Patient will be admitted to the [address] Internal Medicine assistance for admission  They were contacted by the emergency department  I recommend continuing her broad-spectrum cefepime, following up on the cultures obtained from her bed blood and in her urine  I recommend keeping the patient in the hospital until her culture data finalize is and she can be discharged with an appropriate antibiotic plan with a 14 day course of culture directed therapy  I requested follow-up in my office 1 week following discharge    We will obtain a repeat urine culture at that time and plan for her second-stage procedure in the form of left ureteroscopy    Plan of care discussed with the patient's daughter by phone

## 2019-09-14 NOTE — ASSESSMENT & PLAN NOTE
History of atrial fibrillation  Not on any anticoagulation chronically  Continue with Coreg    Appears to be in sinus rhythm

## 2019-09-14 NOTE — ED NOTES
Patient transported to [facility], [address] [place] [address]  09/14/19 0645

## 2019-09-14 NOTE — CONSULTS
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  4  Diabetes  5  Severe urinary tract infection    Based upon the clinical picture I have recommended proceeding to the operating room on an emergent basis for cystoscopy with left ureteral stent placement  I discussed with Leigha Osorio that the purpose of surgeries source control for severe underlying infection with concern for pending urosepsis  Discussed that she will require secondary procedure to extract the stone via ureteroscopy and that this will be performed electively once she has recovered from this acute infectious event  Regarding today surgery, The risks include bleeding, infection, injury to the urethra, bladder, ureter or kidney, risk of a staged procedure, risk of stricture, risk of residual fragments, risk of loss of kidney, risks of anesthesia including DVT, PE, MI and death  The patient understands that a ureteral stent will likely be left in place at the time of the procedure  We reviewed the expected postoperative care  We also discussed that due to her prior history of pelvic radiation and vulvar resection with reconstruction she is an increased risk of complications both postoperatively, as well as increased risk of being able to place a stent which may require nephrostomy tube        PLAN:     - To OR for cystoscopy left ureteral stent placement  - patient will require hospital admission to Internal Medicine following procedure    Appreciate their involving in the care of this patient  They were contacted by the emergency department  - will continue broad-spectrum cefepime  - urine and blood cultures have been appropriate collected  - finally we discussed the small possibility that the stone will not be able to be treated with a stent and a nephrostomy tube will need to be placed  I discussed this procedure in detail including the risks benefits  If the stent is not able to place, tentative plan will be to keep the patient under anesthesia consult interventional Radiology for this procedure under 1 anesthetic setting given her cardiopulmonary risk factors  Patient is amenable to this plan    I have spent 45 minutes with Patient  today in which greater than 50% of this time was spent in counseling/coordination of care regarding Diagnostic results, Prognosis, Risks and benefits of tx options, Risk factor reductions and Impressions  Thank you for allowing me to participate in this patients care  Please do not hesitate to call with any additional questions  Earnest Le MD    History of Present Illness:     Gladys Ortiz is a 52 y o  old with a history of recurrent nephrolithiasis  She has never required surgical intervention  He does have a history of squamous cell carcinoma of the vulva status post resection with flap placement as well as pelvic radiation number of years ago managed at [facility]  She has had pelvic pain and pressure for approximately 2 weeks time  She also had symptoms of a urinary tract infection with foul-smelling urine  She has been afebrile  She presented to the emergency department this morning  She has been hemodynamically stable and afebrile  CT scan reveals a 1 cm left distal ureteral calculus with severe hydronephrosis, pan-thickening of the proximal ureter and renal pelvis severe hydronephrosis as well as a 9 mm left lower pole stone  Findings were concerning for pyelonephritis      On evaluation patient is hemodynamically stable  Her medical comorbidities include diabetes    Past Medical, Past Surgical History:     Past Medical History:   Diagnosis Date    [facility]     Diabetes mellitus ([facility] )     Hypertension    :    Past Surgical History:   Procedure Laterality Date    CARDIAC DEFIBRILLATOR PLACEMENT     :    Medications, Allergies:     Current Facility-Administered Medications:     sodium chloride 0 9 % bolus 1,000 mL, 1,000 mL, Intravenous, Once, Claribel Lynn MD, Last Rate: 500 mL/hr at 09/14/19 0742, 1,000 mL at 09/14/19 0742    Allergies: Allergies   Allergen Reactions    Penicillins Hives   :    Social and Family History:   Social History:   Social History     Tobacco Use    Smoking status: Current Every Day Smoker     Packs/day: 0 50    Smokeless tobacco: Never Used   Substance Use Topics    Alcohol use: Never     Frequency: Never    Drug use: Never     Social History     Tobacco Use   Smoking Status Current Every Day Smoker    Packs/day: 0 50   Smokeless Tobacco Never Used       Family History:  History reviewed  No pertinent family history :     Review of Systems:     General: Fever, chills, or night sweats: negative  Cardiac: Negative for chest pain  Pulmonary: Negative for shortness of breath  Gastrointestinal: Abdominal pain negative  Nausea, vomiting, or diarrhea negative,  Genitourinary: See HPI above  Patient does not have hematuria  All other systems queried were negative  Physical Exam:   General: Patient is pleasant and in NAD  Awake and alert  /79   Pulse 100   Temp 99 °F (37 2 °C)   Resp 18   Ht 5' (1 524 m)   Wt 63 7 kg (140 lb 6 9 oz)   SpO2 97%   BMI 27 43 kg/m²   Cardiac: Peripheral edema: positive  Pulmonary: Non-labored breathing  Abdomen: Soft, non-tender, non-distended  No surgical scars  No masses, tenderness, hernias noted  Genitourinary: Positive CVA tenderness, positive suprapubic tenderness      I did perform a pelvic examination accompanied by a female nurse  This was necessary given her prior surgical history and radiation history to ensure that her anatomy would be amenable to a retrograde management approach  Areas of resection and reconstruction appeared to be quite distal to the urethral meatus which was readily visible and normal   There is a posterior defect with a rectocele present and globally atrophic tissue within the introitus consistent with prior radiation    Labs:     Lab Results   Component Value Date    HGB 13 8 09/14/2019    HCT 41 3 09/14/2019    WBC 9 69 09/14/2019     09/14/2019   ]    Lab Results   Component Value Date    K 3 3 (L) 09/14/2019    CL 96 (L) 09/14/2019    CO2 27 09/14/2019    BUN 15 09/14/2019    CREATININE 1 21 09/14/2019    CALCIUM 9 3 09/14/2019   ]    Imaging:   I personally reviewed the images and report of the following studies, and reviewed them with the patient:      FINDINGS:     ABDOMEN     LOWER CHEST:  Atelectatic changes are noted at the lung bases        LIVER/BILIARY TREE:  Mildly enlarged with fatty infiltrative changes      GALLBLADDER:  Surgically absent      SPLEEN:  Mildly enlarged      PANCREAS:  Unremarkable      ADRENAL GLANDS:  Unremarkable      KIDNEYS/URETERS:  RIGHT KIDNEY:  No renal or ureteric calculi  Excreted contrast material into the collecting system      One or more sharply circumscribed subcentimeter renal hypodensities are noted  These lesions are too small to accurately characterize, but are statistically most likely to represent benign cortical renal cyst(s)  According to the guidelines published in   the [facility] Paper of the ACR Incidental Findings Committee (Radiology 2010), no further workup of these lesions is recommended         LEFT KIDNEY:  There is delayed enhancement of the kidney      There is severe hydroureteronephrosis, up to the level of a 10 x 10 mm calculus in the distal ureter (series 2, image 63 and series 601, image 80)  This is located approximately 6 cm above the ureterovesical junction      There is an additional 9 mm calculus in the lower pole (series 2, image 41 and series 601, image 75)     There is mild enhancement of the collecting system and ureter      Mild perinephric and periureteric inflammation, surrounding the left kidney  No perinephric fluid collections to suggest an abscess      One or more sharply circumscribed subcentimeter renal hypodensities are noted  These lesions are too small to accurately characterize, but are statistically most likely to represent benign cortical renal cyst(s)  According to the guidelines published in   the [facility] Paper of the ACR Incidental Findings Committee (Radiology 2010), no further workup of these lesions is recommended            STOMACH AND BOWEL:    Stomach incompletely distended with ingested fluids  Hiatal hernia      No evidence of small bowel obstruction      Normal fecal burden throughout the colon  Scattered diverticula throughout the descending colon and sigmoid colon  Nothing to suggest acute diverticulitis      APPENDIX:  No findings to suggest appendicitis      ABDOMINOPELVIC CAVITY: Trace free fluid in the pelvis  No ascites or free intraperitoneal air  No lymphadenopathy      VESSELS:  Significant atherosclerotic disease, advanced for the patient's age      PELVIS     REPRODUCTIVE ORGANS:  Heterogeneous appearance of the uterus, suggesting myomatous changes      URINARY BLADDER:  Incompletely distended  Inadequately evaluated      ABDOMINAL WALL/INGUINAL REGIONS:  Unremarkable      OSSEOUS STRUCTURES:  No acute fracture or destructive osseous lesion      Degenerative changes lumbar spine, most pronounced L5-S1      IMPRESSION:  1  Severely obstructing 10 mm distal left ureteric calculus approximately 6 cm above the ureterovesical junction  2   Enhancement of the left collecting system and ureter, suspicious for pyelonephritis    3   Nonobstructing 9 mm left lower pole renal calculus  4   Fatty infiltrative changes of the liver      The study was marked in EPIC for immediate notification

## 2019-09-14 NOTE — H&P
H&P- Mikala Esparza 1965, 52 y o  female MRN: [id number]    Unit/Bed#: -01 Encounter: [id number]    Primary Care Provider: Venus English MD   Date and time admitted to hospital: 9/14/2019  6:31 AM        * Pyelonephritis  Assessment & Plan  Patient presented with 2-3 weeks of left sided flank pain associated with generalized weakness and loss of appetite  Symptom worsened in the last few days  UA +ve, Imaging evidence of left severe hydronephrosis 2/2 to distal ureteric stone  JJ stent placed today  Patient on Cefepime, continue broad spectrum antibiotics and switch to appropriate abx with culture result       Hydronephrosis concurrent with and due to calculi of kidney and ureter  Assessment & Plan  Left severe hydronephrosis secondary to distal ureteric stone  Status post JJ stent placement  Continue in-house urology follow-up    Ureterolithiasis  Assessment & Plan  Left lower pole stone and distal ureteric stone  Associated UTI on antibiotics    CHF (congestive heart failure) ([facility])  Assessment & Plan  Wt Readings from Last 3 Encounters:   09/14/19 63 7 kg (140 lb 6 9 oz)       History of CHF on Lasix, carvedilol, losartan, spironolactone  S/p ICD placement  No clinical evidence of exacerbation  Continue all home medications  Monitor for hyper kalemia with daily BMP  Avoid Ringer's lactate as patient is taking losartan and spironolactone for increased risk of hyperkalemia      Type 2 diabetes mellitus, without long-term current use of insulin ([facility] [address] )  Assessment & Plan  No results found for: HGBA1C    Recent Labs     09/14/19  1009 09/14/19  1049   POCGLU 98 108       Blood Sugar Average: Last 72 hrs:  (P) 103     DM 2 on weekly GLP1  Continue home medications with corrective insulin premeal      VTE Prophylaxis: Heparin  / sequential compression device   Code Status: full code  POLST: There is no POLST form on file for this patient (pre-hospital)    Anticipated Length of Stay:  Patient will be admitted on an Inpatient basis with an anticipated length of stay of  More than 2 midnights  Justification for Hospital Stay: pyelonephritis requiring IV antibiotics    Total Time for Visit, including Counseling / Coordination of Care: 45 minutes  Greater than 50% of this total time spent on direct patient counseling and coordination of care  History of Present Illness:    Mulu Morrissey is a 52 y o  female who presented with left-sided flank pain for the last 2 weeks which was progressively getting worse in the last few days  Pain is associated with loss of appetite, generalized body weakness  Patient had left-sided shingles few weeks prior to her current problem and assumed the pain is related with her shingles  In the last few days patient started to notice change in the order of her urinet, dysuria and frequency  After presenting to our hospital patient was found to have left ureteric stone with severe hydronephrosis with UA evidence of UTI for which the patient is taken to the OR and cystoscopy and JJ stent placement done  Past medical history is significant for vulvar malignancy status post resection, CHF on medical treatment and status post ICD placement, diabetes on on GLP 1  Patient will be admitted to medical floor to be continued with broad-spectrum IV antibiotics, follow urine culture and adjust antibiotics  Review of Systems:    Review of Systems   Constitutional: Positive for fatigue  Negative for chills, fever and unexpected weight change  HENT: Negative for congestion  Eyes: Negative for pain and visual disturbance  Respiratory: Negative for cough, choking, chest tightness, shortness of breath and wheezing  Cardiovascular: Negative for chest pain, palpitations and leg swelling  Gastrointestinal: Positive for abdominal pain  Negative for abdominal distention, constipation, nausea and vomiting  Endocrine: Negative for cold intolerance and heat intolerance     Genitourinary: Positive for dysuria, flank pain, frequency and hematuria  Negative for difficulty urinating  Musculoskeletal: Negative for myalgias and neck pain  Skin: Negative for color change  Neurological: Negative for dizziness, tremors, weakness and numbness  Psychiatric/Behavioral: Negative for agitation, behavioral problems and confusion  Past Medical and Surgical History:     Past Medical History:   Diagnosis Date    [facility]fib [facility] )     Diabetes mellitus ([facility] )     Hypertension        Past Surgical History:   Procedure Laterality Date    CARDIAC DEFIBRILLATOR PLACEMENT         Meds/Allergies:    Prior to Admission medications    Medication Sig Start Date End Date Taking?  Authorizing Provider   aspirin 81 mg chewable tablet Chew 81 mg daily   Yes Historical Provider, MD   atorvastatin (LIPITOR) 10 mg tablet Take 10 mg by mouth daily   Yes Historical Provider, MD   carvedilol (COREG) 6 25 mg tablet Take 6 25 mg by mouth   Yes Historical Provider, MD   fenofibrate micronized (LOFIBRA) 134 MG capsule Take 134 mg by mouth every morning before breakfast   Yes Historical Provider, MD   furosemide (LASIX) 40 mg tablet TAKE 1 TABLET BY MOUTH EVERY DAY 6/17/19  Yes Historical Provider, MD   Icosapent Ethyl (VASCEPA) 1 g CAPS Take 1 capsule by mouth 2 (two) times a day 6/28/19  Yes Historical Provider, MD   losartan (COZAAR) 100 MG tablet Take 100 mg by mouth daily   Yes Historical Provider, MD   pantoprazole (PROTONIX) 40 mg tablet Take 40 mg by mouth daily   Yes Historical Provider, MD   Semaglutide (OZEMPIC) 0 25 or 0 5 MG/DOSE SOPN Take 0 25 mg by mouth 7/8/19  Yes Historical Provider, MD   spironolactone (ALDACTONE) 25 mg tablet TAKE 1 TABLET BY MOUTH EVERY DAY 6/17/19  Yes Historical Provider, MD   traMADol (ULTRAM) 50 mg tablet Take 50 mg by mouth every 6 (six) hours as needed for moderate pain   Yes Historical Provider, MD   vitamin B-12 (VITAMIN B-12) 1,000 mcg tablet Take 1,000 mcg by mouth daily   Yes Historical Provider, MD   valACYclovir (VALTREX) 1,000 mg tablet Take 1,000 mg by mouth 2 (two) times a day    Historical Provider, MD     I have reviewed home medications with patient personally  Allergies: Allergies   Allergen Reactions    Penicillins Hives       Social History:     Substance Use History:   Social History     Substance and Sexual Activity   Alcohol Use Never    Frequency: Never     Social History     Tobacco Use   Smoking Status Current Every Day Smoker    Packs/day: 0 50   Smokeless Tobacco Never Used     Social History     Substance and Sexual Activity   Drug Use Never       Family History:    non-contributory    Physical Exam:     Vitals:   Blood Pressure: 101/66 (09/14/19 1237)  Pulse: 102 (09/14/19 1237)  Temperature: 99 4 °F (37 4 °C) (09/14/19 1237)  Temp Source: Oral (09/14/19 1144)  Respirations: 17 (09/14/19 1237)  Height: 5' (152 4 cm) (09/14/19 0745)  Weight - Scale: 63 7 kg (140 lb 6 9 oz) (09/14/19 0745)  SpO2: 95 % (09/14/19 1237)    Physical Exam    General appearance: alert, appears stated age and cooperative  Skin: left T5/6 region dermatomal skin lesion which is healed and no evidence of active lesions  Head: Normocephalic, without obvious abnormality, atraumatic  Eyes: conjunctivae/corneas clear  PERRL, EOM's intact  Lungs: clear to auscultation bilaterally  Heart: regular rate and rhythm, S1, S2 normal, no murmur, click, rub or gallop  Abdomen: soft, non-tender; bowel sounds normal; no masses,  no organomegaly  Back: left CVA tenderness  Extremities: extremities normal, atraumatic, no cyanosis or edema  Neurologic: Grossly normal  Psychiatric: mood appropriate    Additional Data:     Lab Results: I have personally reviewed pertinent reports        Results from last 7 days   Lab Units 09/14/19  0658   WBC Thousand/uL 9 69   HEMOGLOBIN g/dL 13 8   HEMATOCRIT % 41 3   PLATELETS Thousands/uL 240   NEUTROS PCT % 87*   LYMPHS PCT % 7*   MONOS PCT % 4   EOS PCT % 2 Results from last 7 days   Lab Units 09/14/19  0658   SODIUM mmol/L 134*   POTASSIUM mmol/L 3 3*   CHLORIDE mmol/L 96*   CO2 mmol/L 27   BUN mg/dL 15   CREATININE mg/dL 1 21   ANION GAP mmol/L 11   CALCIUM mg/dL 9 3   ALBUMIN g/dL 3 0*   TOTAL BILIRUBIN mg/dL 0 70   ALK PHOS U/L 54   ALT U/L 15   AST U/L 10   GLUCOSE RANDOM mg/dL 163*     Results from last 7 days   Lab Units 09/14/19  0829   INR  1 13     Results from last 7 days   Lab Units 09/14/19  1049 09/14/19  1009   POC GLUCOSE mg/dl 108 98         Results from last 7 days   Lab Units 09/14/19  0829   LACTIC ACID mmol/L 1 7       Imaging: I have personally reviewed pertinent reports  CT abdomen pelvis with contrast   Final Result by Ella Ruiz DO (09/14 0461)   1  Severely obstructing 10 mm distal left ureteric calculus approximately 6 cm above the ureterovesical junction  2   Enhancement of the left collecting system and ureter, suspicious for pyelonephritis  3   Nonobstructing 9 mm left lower pole renal calculus  4   Fatty infiltrative changes of the liver  The study was marked in [facility] for immediate notification  Workstation performed: [id number]         FL retrograde pyelogram    (Results Pending)       EKG, Pathology, and Other Studies Reviewed on Admission: yes    Allscripts / Epic Records Reviewed: Yes     ** Please Note: This note has been constructed using a voice recognition system   **

## 2019-09-14 NOTE — ASSESSMENT & PLAN NOTE
Wt Readings from Last 3 Encounters:   09/14/19 63 7 kg (140 lb 6 9 oz)       History of CHF on Lasix, carvedilol, losartan, spironolactone  S/p ICD placement  No clinical evidence of exacerbation  Continue all home medications  Monitor for hyper kalemia with daily BMP  Avoid Ringer's lactate as patient is taking losartan and spironolactone for increased risk of hyperkalemia

## 2019-09-14 NOTE — ASSESSMENT & PLAN NOTE
No results found for: HGBA1C    Recent Labs     09/14/19  1009 09/14/19  1049   POCGLU 98 108       Blood Sugar Average: Last 72 hrs:  (P) 103     DM 2 on weekly GLP1  Continue home medications with corrective insulin premeal

## 2019-09-15 LAB
ANION GAP SERPL CALCULATED.3IONS-SCNC: 10 MMOL/L (ref 4–13)
BASOPHILS # BLD MANUAL: 0.11 THOUSAND/UL (ref 0–0.1)
BASOPHILS NFR MAR MANUAL: 2 % (ref 0–1)
BUN SERPL-MCNC: 12 MG/DL (ref 5–25)
CALCIUM SERPL-MCNC: 8.9 MG/DL (ref 8.3–10.1)
CHLORIDE SERPL-SCNC: 100 MMOL/L (ref 100–108)
CO2 SERPL-SCNC: 26 MMOL/L (ref 21–32)
CREAT SERPL-MCNC: 0.98 MG/DL (ref 0.6–1.3)
EOSINOPHIL # BLD MANUAL: 0.05 THOUSAND/UL (ref 0–0.4)
EOSINOPHIL NFR BLD MANUAL: 1 % (ref 0–6)
ERYTHROCYTE [DISTWIDTH] IN BLOOD BY AUTOMATED COUNT: 12.4 % (ref 11.6–15.1)
GFR SERPL CREATININE-BSD FRML MDRD: 65 ML/MIN/1.73SQ M
GLUCOSE SERPL-MCNC: 102 MG/DL (ref 65–140)
GLUCOSE SERPL-MCNC: 113 MG/DL (ref 65–140)
GLUCOSE SERPL-MCNC: 118 MG/DL (ref 65–140)
GLUCOSE SERPL-MCNC: 121 MG/DL (ref 65–140)
GLUCOSE SERPL-MCNC: 125 MG/DL (ref 65–140)
HCT VFR BLD AUTO: 34.6 % (ref 34.8–46.1)
HGB BLD-MCNC: 11.4 G/DL (ref 11.5–15.4)
LYMPHOCYTES # BLD AUTO: 0.48 THOUSAND/UL (ref 0.6–4.47)
LYMPHOCYTES # BLD AUTO: 9 % (ref 14–44)
MCH RBC QN AUTO: 30.5 PG (ref 26.8–34.3)
MCHC RBC AUTO-ENTMCNC: 32.9 G/DL (ref 31.4–37.4)
MCV RBC AUTO: 93 FL (ref 82–98)
MONOCYTES # BLD AUTO: 0.21 THOUSAND/UL (ref 0–1.22)
MONOCYTES NFR BLD: 4 % (ref 4–12)
NEUTROPHILS # BLD MANUAL: 4.49 THOUSAND/UL (ref 1.85–7.62)
NEUTS SEG NFR BLD AUTO: 84 % (ref 43–75)
NRBC BLD AUTO-RTO: 0 /100 WBCS
PLATELET # BLD AUTO: 188 THOUSANDS/UL (ref 149–390)
PLATELET BLD QL SMEAR: ADEQUATE
PMV BLD AUTO: 9.6 FL (ref 8.9–12.7)
POTASSIUM SERPL-SCNC: 3.5 MMOL/L (ref 3.5–5.3)
RBC # BLD AUTO: 3.74 MILLION/UL (ref 3.81–5.12)
SODIUM SERPL-SCNC: 136 MMOL/L (ref 136–145)
TOTAL CELLS COUNTED SPEC: 100
WBC # BLD AUTO: 5.34 THOUSAND/UL (ref 4.31–10.16)

## 2019-09-15 PROCEDURE — 82948 REAGENT STRIP/BLOOD GLUCOSE: CPT

## 2019-09-15 PROCEDURE — 83036 HEMOGLOBIN GLYCOSYLATED A1C: CPT | Performed by: INTERNAL MEDICINE

## 2019-09-15 PROCEDURE — 80048 BASIC METABOLIC PNL TOTAL CA: CPT | Performed by: INTERNAL MEDICINE

## 2019-09-15 PROCEDURE — 85027 COMPLETE CBC AUTOMATED: CPT | Performed by: INTERNAL MEDICINE

## 2019-09-15 PROCEDURE — 93005 ELECTROCARDIOGRAM TRACING: CPT

## 2019-09-15 PROCEDURE — 99233 SBSQ HOSP IP/OBS HIGH 50: CPT | Performed by: INTERNAL MEDICINE

## 2019-09-15 PROCEDURE — 85007 BL SMEAR W/DIFF WBC COUNT: CPT | Performed by: INTERNAL MEDICINE

## 2019-09-15 RX ADMIN — MORPHINE SULFATE 2 MG: 2 INJECTION, SOLUTION INTRAMUSCULAR; INTRAVENOUS at 19:06

## 2019-09-15 RX ADMIN — HEPARIN SODIUM 5000 UNITS: 5000 INJECTION INTRAVENOUS; SUBCUTANEOUS at 05:20

## 2019-09-15 RX ADMIN — SODIUM CHLORIDE 75 ML/HR: 0.9 INJECTION, SOLUTION INTRAVENOUS at 04:06

## 2019-09-15 RX ADMIN — HEPARIN SODIUM 5000 UNITS: 5000 INJECTION INTRAVENOUS; SUBCUTANEOUS at 21:31

## 2019-09-15 RX ADMIN — HEPARIN SODIUM 5000 UNITS: 5000 INJECTION INTRAVENOUS; SUBCUTANEOUS at 14:38

## 2019-09-15 RX ADMIN — MORPHINE SULFATE 2 MG: 2 INJECTION, SOLUTION INTRAMUSCULAR; INTRAVENOUS at 05:21

## 2019-09-15 RX ADMIN — CEFEPIME HYDROCHLORIDE 2000 MG: 2 INJECTION, POWDER, FOR SOLUTION INTRAVENOUS at 20:31

## 2019-09-15 RX ADMIN — ASPIRIN 81 MG 81 MG: 81 TABLET ORAL at 08:40

## 2019-09-15 RX ADMIN — NICOTINE 1 PATCH: 7 PATCH TRANSDERMAL at 08:40

## 2019-09-15 RX ADMIN — FENOFIBRATE 145 MG: 145 TABLET, COATED ORAL at 08:40

## 2019-09-15 RX ADMIN — PANTOPRAZOLE SODIUM 40 MG: 40 TABLET, DELAYED RELEASE ORAL at 08:40

## 2019-09-15 RX ADMIN — CEFEPIME HYDROCHLORIDE 2000 MG: 2 INJECTION, POWDER, FOR SOLUTION INTRAVENOUS at 08:45

## 2019-09-15 RX ADMIN — ATORVASTATIN CALCIUM 10 MG: 10 TABLET, FILM COATED ORAL at 08:41

## 2019-09-15 RX ADMIN — MORPHINE SULFATE 2 MG: 2 INJECTION, SOLUTION INTRAMUSCULAR; INTRAVENOUS at 11:58

## 2019-09-15 NOTE — UTILIZATION REVIEW
Initial Clinical Review    Admission: Date/Time/Statement: Inpatient Admission Orders (From admission, onward)     Ordered        09/14/19 0936  Inpatient Admission (expected length of stay for this patient Order details is greater than two midnights)  Once                   Orders Placed This Encounter   Procedures    Inpatient Admission (expected length of stay for this patient Order details is greater than two midnights)     Standing Status:   Standing     Number of Occurrences:   1     Order Specific Question:   Admitting Physician     Answer:   Matthew Hsieh [id number]     Order Specific Question:   Level of Care     Answer:   Med Surg [16]     Order Specific Question:   Estimated length of stay     Answer:   More than 2 Midnights     Order Specific Question:   Certification     Answer:   I certify that inpatient services are medically necessary for this patient for a duration of greater than two midnights  See H&P and MD Progress Notes for additional information about the patient's course of treatment  ED Arrival Information     Expected Arrival Acuity Means of Arrival Escorted By Service Admission Type    - 9/14/2019 06:22 Urgent Wheelchair Family Member General Medicine Urgent    Arrival Complaint    Abdominal Pain        Chief Complaint   Patient presents with    Abdominal Pain     Pt presents to the ED with LLQ abdominal pain that radiates to her left flank with difficulty urinating, and polyuria  Pt reported her symptoms began a week ago  Assessment/Plan:    52  Y O female  Presents to ED   From home  With L sided  Flank pain for past 2 weeks,  Progressively  Worse last few days  Has loss of appetite and general body weakness  Pt  Did  Admit to having  Shingles  On L side of her  Body a few weeks ago and felt  This  Pain was  Related  Has now noticed  Changes  In  Her urine color with dysuria and frequency in the past few days  Found  With  L uretreal stone and hydronephrosis    PMH  Is CHF, ICD placement and vulvar malignancy s/P  Resection  ADMIT  IP MED SURG  LOC  WITH  Pyelonephritis, hydronephrosis and  Ureterolithiasis  And plan is  OR, pain control, SAMMI   And monitor  Labs  Per  Urology  Consult:     1  1 cm left distal ureteral calculus with severe obstruction  2  9 mm left lower pole calculus  3  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  4  Diabetes  5  Severe urinary tract infection    DATE OF PROCEDURE:   9/14/2019    PRE-OP DIAGNOSIS:   1) severe hydronephrosis  2) 1 cm obstructing left distal stone  3) 9 mm left lower pole stone  4) urinary tract infection     POST-OP DIAGNOSIS:   1) severe hydronephrosis  2) 1 cm obstructing left distal stone  3) 9 mm left lower pole stone  4) urinary tract infection     PROCEDURES PERFORMED:  1) Cystoscopy  2) left retrograde pyelography with fluoroscopic interpretation  3) left ureteral stent placement (6F x 24cm  FINDINGS:     The large left distal calculus was radiopaque  Severe hydroureteronephrosis noted on retrograde pyelogram   The stent was able to be placed successfully to drain the kidney  There was a hydronephrotic drip of frankly purulent urine drained from the kidney    Patient remained hemodynamically stable throughout the procedure and postoperatively    ED Triage Vitals   Temperature Pulse Respirations Blood Pressure SpO2   09/14/19 0634 09/14/19 0634 09/14/19 0634 09/14/19 0634 09/14/19 0634   98 °F (36 7 °C) 95 20 116/58 98 %      Temp Source Heart Rate Source Patient Position - Orthostatic VS BP Location FiO2 (%)   09/14/19 0634 09/14/19 0634 09/14/19 0847 09/14/19 0634 --   Oral Monitor Lying Right arm       Pain Score       09/14/19 0634       Worst Possible Pain        Wt Readings from Last 1 Encounters:   09/15/19 66 kg (145 lb 8 1 oz)     Additional Vital Signs:   09/15/19 0800              None (Room air)       09/15/19 07:18:39  98 5 °F (36 9 °C)  90  17 97/66  76  94 %         09/14/19 23:27:45  100 5 °F (38 1 °C)  112Abnormal     101/70  80  95 %         09/14/19 15:37:18  98 2 °F (36 8 °C)  97  18  99/68  78  94 %         09/14/19 14:31:59  97 9 °F (36 6 °C)  107Abnormal   18  103/69  80  96 %         09/14/19 13:30:51  99 7 °F (37 6 °C)  109Abnormal   16  106/68  81  93 %         09/14/19 12:37:25  99 4 °F (37 4 °C)  102  17  101/66  78  95 %         09/14/19 1144  98 5 °F (36 9 °C)  75  18  100/66    95 %  None (Room air)    Lying   09/14/19 1115  101 1 °F (38 4 °C)Abnormal   101  21  106/56    95 %  None (Room air)  X     09/14/19 1110    101  15  111/57    93 %         09/14/19 1105    96  15  100/55    97 %         09/14/19 1100    100  22  112/56    97 %  None (Room air)  X     09/14/19 1055    99  21  101/59    97 %         09/14/19 1050    99  21  97/55    96 %         09/14/19 1045  99 1 °F (37 3 °C)  100  20  100/55    98 %  None (Room air)  X           Pertinent Labs/Diagnostic Test Results:   Results from last 7 days   Lab Units 09/15/19  0453 09/14/19  1405 09/14/19  0658   WBC Thousand/uL 5 34  --  9 69   HEMOGLOBIN g/dL 11 4*  --  13 8   HEMATOCRIT % 34 6*  --  41 3   PLATELETS Thousands/uL 188 190 240   NEUTROS ABS Thousands/µL  --   --  8 35*         Results from last 7 days   Lab Units 09/15/19  0453 09/14/19  2050 09/14/19  1405 09/14/19  0658   SODIUM mmol/L 136 137 135* 134*   POTASSIUM mmol/L 3 5 3 5 3 0* 3 3*   CHLORIDE mmol/L 100 101 100 96*   CO2 mmol/L 26 28 26 27   ANION GAP mmol/L 10 8 9 11   BUN mg/dL 12 12 11 15   CREATININE mg/dL 0 98 1 09 0 92 1 21   EGFR ml/min/1 73sq m 65 57 70 50   CALCIUM mg/dL 8 9 8 6 8 8 9 3     Results from last 7 days   Lab Units 09/14/19  0658   AST U/L 10   ALT U/L 15   ALK PHOS U/L 54   TOTAL PROTEIN g/dL 7 2   ALBUMIN g/dL 3 0*   TOTAL BILIRUBIN mg/dL 0 70     Results from last 7 days   Lab Units 09/15/19  0500 09/14/19  2041 09/14/19  1647 09/14/19  1049 09/14/19  1009   POC GLUCOSE mg/dl 121 165* 93 108 98     Results from last 7 days   Lab Units 09/15/19  0453 09/14/19  2050 09/14/19  1405 09/14/19  0658   GLUCOSE RANDOM mg/dL 118 156* 174* 163*           Results from last 7 days   Lab Units 09/14/19  0829   PROTIME seconds 14 5   INR  1 13   PTT seconds 24             Results from last 7 days   Lab Units 09/14/19  0829   LACTIC ACID mmol/L 1 7         Results from last 7 days   Lab Units 09/14/19  0739   CLARITY UA  Cloudy   COLOR UA  Yellow   SPEC GRAV UA  >=1 030   PH UA  6 0   GLUCOSE UA mg/dl Negative   KETONES UA mg/dl Negative   BLOOD UA  Large*   PROTEIN UA mg/dl 100 (2+)*   NITRITE UA  Negative   BILIRUBIN UA  Small*   UROBILINOGEN UA E U /dl 2 0*   LEUKOCYTES UA  Moderate*   WBC UA /hpf Innumerable*   RBC UA /hpf 10-20*   BACTERIA UA /hpf Occasional   EPITHELIAL CELLS WET PREP /hpf Occasional           Results from last 7 days   Lab Units 09/14/19  0949 09/14/19  0739   URINE CULTURE  50,000-59,000 cfu/ml Gram Negative Sánchez Enteric Like* >100,000 cfu/ml Gram Negative Sánchez Enteric Like*     Results from last 7 days   Lab Units 09/15/19  0453   TOTAL COUNTED  100           ED Treatment:   Medication Administration from 09/14/2019 0622 to 09/14/2019 0900       Date/Time Order Dose Route Action Comments     09/14/2019 0742 sodium chloride 0 9 % bolus 1,000 mL 1,000 mL Intravenous New Bag      09/14/2019 0743 HYDROmorphone (DILAUDID) injection 0 5 mg 0 5 mg Intravenous Given      09/14/2019 0802 iohexol (OMNIPAQUE) 350 MG/ML injection (MULTI-DOSE) 100 mL 100 mL Intravenous Given      09/14/2019 0848 cefepime (MAXIPIME) 2,000 mg in dextrose 5 % 50 mL IVPB 0 mg Intravenous Stopped      09/14/2019 0839 cefepime (MAXIPIME) 2,000 mg in dextrose 5 % 50 mL IVPB 2,000 mg Intravenous New Bag       Present on Admission:   [facility]congestive heart failure) [facility])   Type 2 diabetes mellitus, without long-term current use of insulin (HCC)   Hydronephrosis concurrent with and due to calculi of kidney and ureter   Hypertension   Atrial fibrillation (HCC)      Admitting Diagnosis: Ureterolithiasis [N20 1]  Abdominal pain [R10 9]  Pyelonephritis [N12]  Age/Sex: 52 y o  female  Admission Orders:    Current Facility-Administered Medications:  acetaminophen 650 mg Oral Q6H PRN   aspirin 81 mg Oral Daily   atorvastatin 10 mg Oral Daily   carvedilol 6 25 mg Oral BID With Meals   cefepime 2,000 mg Intravenous Q12H   fenofibrate 145 mg Oral Daily   furosemide 40 mg Oral Daily   heparin (porcine) 5,000 Units Subcutaneous Q8H [facility]   insulin lispro 1-5 Units Subcutaneous TID AC   losartan 100 mg Oral Daily   morphine injection 2 mg Intravenous Q6H PRN    X1  9/14 and  X1  9/15  Thus far   nicotine 1 patch Transdermal Daily   pantoprazole 40 mg Oral Daily   spironolactone 25 mg Oral Daily     IVF  75/hr  -  D/c  9/15     @    [address] [facility] Utilization Review Department  Phone: [phone]; Fax [phone]  [email or web address]  ATTENTION: Please call with any questions or concerns to [phone]  and carefully listen to the prompts so that you are directed to the right person  Send all requests for admission clinical reviews, approved or denied determinations and any other requests to fax [phone]   All voicemails are confidential

## 2019-09-15 NOTE — ASSESSMENT & PLAN NOTE
No results found for: HGBA1C    Recent Labs     09/15/19  1049 09/15/19  1532 09/15/19  2107 09/16/19  0619   POCGLU 102 113 125 149*       Blood Sugar Average: Last 72 hrs:  (P) 598 [id number]     Check hemoglobin A1c   Continue with current treatment    Accu-Cheks are fair

## 2019-09-15 NOTE — ASSESSMENT & PLAN NOTE
Status post stent placement as above  Fritz catheter has been taken out  Currently on voiding trials as per Urology

## 2019-09-15 NOTE — PLAN OF CARE
Problem: Potential for Falls  Goal: Patient will remain free of falls  Description  INTERVENTIONS:  - Assess patient frequently for physical needs  -  Identify cognitive and physical deficits and behaviors that affect risk of falls  -  [place] fall precautions as indicated by assessment  Patient is a low risk for falls at this time   - Educate patient/family on patient safety including physical limitations  - Instruct patient to call for assistance with activity based on assessment  - Modify environment to reduce risk of injury   Outcome: Progressing     Problem: PAIN - ADULT  Goal: Verbalizes/displays adequate comfort level or baseline comfort level  Description  Interventions:  - Encourage patient to monitor pain and request assistance  - Assess pain using appropriate pain scale  - Administer analgesics based on type and severity of pain and evaluate response  - Implement non-pharmacological measures as appropriate and evaluate response  - Consider cultural and social influences on pain and pain management  - Notify physician/advanced practitioner if interventions unsuccessful or patient reports new pain  Outcome: Progressing     Problem: INFECTION - ADULT  Goal: Absence or prevention of progression during hospitalization  Description  INTERVENTIONS:  - Assess and monitor for signs and symptoms of infection  - Monitor lab/diagnostic results  - Monitor all insertion sites-2 IV insertion sites and an indwelling murillo catheter     - Administer medications as ordered  - Instruct and encourage patient and family to use good hand hygiene technique   Outcome: Progressing     Problem: SAFETY ADULT  Goal: Maintain or return to baseline ADL function  Description  INTERVENTIONS:  -  Assess patient's ability to carry out ADLs; assess patient's baseline for ADL function and identify physical deficits which impact ability to perform ADLs (bathing, care of mouth/teeth, toileting, grooming, dressing, etc )  - Assess/evaluate cause of self-care deficits   - Assess range of motion  - Assess patient's mobility; RW and standby staff assist at this time     - Assess patient's need for assistive devices and provide as appropriate  - Encourage maximum independence but intervene and supervise when necessary  - Involve family in performance of ADLs  - Assess for home care needs following discharge   - Consider OT consult to assist with ADL evaluation and planning for discharge  - Provide patient education as appropriate   Outcome: Progressing  Goal: Maintain or return mobility status to optimal level  Description  INTERVENTIONS:  - Assess patient's baseline mobility status (ambulation, transfers, stairs, etc )    - Identify cognitive and physical deficits and behaviors that affect mobility  - Identify mobility aids required to assist with transfers and/or ambulation- RW and staff standby [address] [place] [address]/[facility] fall precautions as indicated by assessment  - Record patient progress and toleration of activity level on Mobility SBAR; progress patient to next Phase/Stage  - Instruct patient to call for assistance with activity based on assessment  - Consider rehabilitation consult to assist with strengthening/weightbearing, etc    Outcome: Progressing     Problem: DISCHARGE PLANNING  Goal: Discharge to home or other facility with appropriate resources  Description  INTERVENTIONS:  - Identify barriers to discharge w/patient and caregiver  - Arrange for needed discharge resources and transportation as appropriate  - Identify discharge learning needs (meds, wound care, etc )  - Arrange for interpretive services to assist at discharge as needed  - Refer to Case Management Department for coordinating discharge planning if the patient needs post-hospital services based on physician/advanced practitioner order or complex needs related to functional status, cognitive ability, or social support system  Outcome: Progressing     Problem: Knowledge Deficit  Goal: Patient/family/caregiver demonstrates understanding of disease process, treatment plan, medications, and discharge instructions  Description  Complete learning assessment and assess knowledge base  Interventions:  - Provide teaching at level of understanding  - Provide teaching via preferred learning methods  Outcome: Progressing     Problem: Nutrition/Hydration-ADULT  Goal: Nutrient/Hydration intake appropriate for improving, restoring or maintaining nutritional needs  Description  Monitor and assess patient's nutrition/hydration status for malnutrition  Collaborate with interdisciplinary team and initiate plan and interventions as ordered  Monitor patient's weight and dietary intake as ordered or per policy  Utilize nutrition screening tool and intervene as necessary  Determine patient's food preferences and provide high-protein, high-caloric foods as appropriate       INTERVENTIONS:  - Monitor oral intake, urinary output, labs, and treatment plans  - Assess nutrition and hydration status and recommend course of action  - Evaluate amount of meals eaten  - Assist patient with eating if necessary   - Allow adequate time for meals  - Recommend/ encourage appropriate diets, oral nutritional supplements, and vitamin/mineral supplements  - Assess need for intravenous fluids  - Provide specific nutrition/hydration education as appropriate  - Include patient/family/caregiver in decisions related to nutrition   Outcome: Progressing     Problem: Prexisting or High Potential for Compromised Skin Integrity  Goal: Skin integrity is maintained or improved  Description  INTERVENTIONS:  - Identify patients at risk for skin breakdown  - Assess and monitor skin integrity  - Assess and monitor nutrition and hydration status  - Monitor labs   - Assess for incontinence   - Turn and reposition patient  - Assist with mobility/ambulation  - Relieve pressure over bony prominences  - Avoid friction and shearing  - Provide appropriate hygiene as needed including keeping skin clean and dry  - Evaluate need for skin moisturizer/barrier cream  - Collaborate with interdisciplinary team   - Patient/family teaching  - Consider wound care consult   Outcome: Progressing

## 2019-09-15 NOTE — ASSESSMENT & PLAN NOTE
Status post stent placement  Pyelonephritis associated with stone  Switch antibiotics to oral   Final cultures are still pending  Could change antibiotics according to sensitivities if needed    Currently she is on Cipro as urine cultures growing E coli and hoping that it would be sensitive to it

## 2019-09-16 VITALS
OXYGEN SATURATION: 99 % | HEART RATE: 78 BPM | SYSTOLIC BLOOD PRESSURE: 112 MMHG | TEMPERATURE: 97.8 F | HEIGHT: 60 IN | WEIGHT: 146.39 LBS | BODY MASS INDEX: 28.74 KG/M2 | RESPIRATION RATE: 18 BRPM | DIASTOLIC BLOOD PRESSURE: 66 MMHG

## 2019-09-16 LAB
ATRIAL RATE: 84 BPM
ATRIAL RATE: 96 BPM
BACTERIA UR CULT: ABNORMAL
BACTERIA UR CULT: ABNORMAL
EST. AVERAGE GLUCOSE BLD GHB EST-MCNC: 114 MG/DL
GLUCOSE SERPL-MCNC: 149 MG/DL (ref 65–140)
GLUCOSE SERPL-MCNC: 154 MG/DL (ref 65–140)
HBA1C MFR BLD: 5.6 % (ref 4.2–6.3)
P AXIS: 41 DEGREES
P AXIS: 71 DEGREES
PR INTERVAL: 162 MS
PR INTERVAL: 166 MS
QRS AXIS: 37 DEGREES
QRS AXIS: 78 DEGREES
QRSD INTERVAL: 94 MS
QRSD INTERVAL: 96 MS
QT INTERVAL: 364 MS
QT INTERVAL: 374 MS
QTC INTERVAL: 441 MS
QTC INTERVAL: 459 MS
T WAVE AXIS: 52 DEGREES
T WAVE AXIS: 78 DEGREES
VENTRICULAR RATE: 84 BPM
VENTRICULAR RATE: 96 BPM

## 2019-09-16 PROCEDURE — 82948 REAGENT STRIP/BLOOD GLUCOSE: CPT

## 2019-09-16 PROCEDURE — 93010 ELECTROCARDIOGRAM REPORT: CPT | Performed by: INTERNAL MEDICINE

## 2019-09-16 PROCEDURE — 99232 SBSQ HOSP IP/OBS MODERATE 35: CPT | Performed by: UROLOGY

## 2019-09-16 PROCEDURE — 99239 HOSP IP/OBS DSCHRG MGMT >30: CPT | Performed by: INTERNAL MEDICINE

## 2019-09-16 RX ORDER — CIPROFLOXACIN 500 MG/1
500 TABLET, FILM COATED ORAL 2 TIMES DAILY
Status: DISCONTINUED | OUTPATIENT
Start: 2019-09-16 | End: 2019-09-16 | Stop reason: HOSPADM

## 2019-09-16 RX ORDER — CIPROFLOXACIN 500 MG/1
500 TABLET, FILM COATED ORAL 2 TIMES DAILY
Qty: 15 TABLET | Refills: 0 | Status: SHIPPED | OUTPATIENT
Start: 2019-09-16 | End: 2019-09-24

## 2019-09-16 RX ADMIN — MORPHINE SULFATE 2 MG: 2 INJECTION, SOLUTION INTRAMUSCULAR; INTRAVENOUS at 06:30

## 2019-09-16 RX ADMIN — FENOFIBRATE 145 MG: 145 TABLET, COATED ORAL at 09:13

## 2019-09-16 RX ADMIN — MORPHINE SULFATE 2 MG: 2 INJECTION, SOLUTION INTRAMUSCULAR; INTRAVENOUS at 12:39

## 2019-09-16 RX ADMIN — CEFEPIME HYDROCHLORIDE 2000 MG: 2 INJECTION, POWDER, FOR SOLUTION INTRAVENOUS at 09:13

## 2019-09-16 RX ADMIN — SPIRONOLACTONE 25 MG: 25 TABLET ORAL at 09:14

## 2019-09-16 RX ADMIN — PANTOPRAZOLE SODIUM 40 MG: 40 TABLET, DELAYED RELEASE ORAL at 09:14

## 2019-09-16 RX ADMIN — INSULIN LISPRO 1 UNITS: 100 INJECTION, SOLUTION INTRAVENOUS; SUBCUTANEOUS at 12:29

## 2019-09-16 RX ADMIN — LOSARTAN POTASSIUM 100 MG: 50 TABLET, FILM COATED ORAL at 09:14

## 2019-09-16 RX ADMIN — HEPARIN SODIUM 5000 UNITS: 5000 INJECTION INTRAVENOUS; SUBCUTANEOUS at 06:25

## 2019-09-16 RX ADMIN — NICOTINE 1 PATCH: 7 PATCH TRANSDERMAL at 09:13

## 2019-09-16 RX ADMIN — CIPROFLOXACIN HYDROCHLORIDE 500 MG: 500 TABLET, FILM COATED ORAL at 12:26

## 2019-09-16 RX ADMIN — CARVEDILOL 6.25 MG: 6.25 TABLET, FILM COATED ORAL at 09:13

## 2019-09-16 RX ADMIN — FUROSEMIDE 40 MG: 40 TABLET ORAL at 09:14

## 2019-09-16 RX ADMIN — ASPIRIN 81 MG 81 MG: 81 TABLET ORAL at 09:13

## 2019-09-16 RX ADMIN — ATORVASTATIN CALCIUM 10 MG: 10 TABLET, FILM COATED ORAL at 09:14

## 2019-09-16 NOTE — PLAN OF CARE
Problem: Potential for Falls  Goal: Patient will remain free of falls  Description  INTERVENTIONS:  - Assess patient frequently for physical needs  -  Identify cognitive and physical deficits and behaviors that affect risk of falls  -  [place] fall precautions as indicated by assessment  Patient is a low risk for falls at this time   - Educate patient/family on patient safety including physical limitations  - Instruct patient to call for assistance with activity based on assessment  - Modify environment to reduce risk of injury   Outcome: Progressing     Problem: PAIN - ADULT  Goal: Verbalizes/displays adequate comfort level or baseline comfort level  Description  Interventions:  - Encourage patient to monitor pain and request assistance  - Assess pain using appropriate pain scale  - Administer analgesics based on type and severity of pain and evaluate response  - Implement non-pharmacological measures as appropriate and evaluate response  - Consider cultural and social influences on pain and pain management  - Notify physician/advanced practitioner if interventions unsuccessful or patient reports new pain  Outcome: Progressing     Problem: INFECTION - ADULT  Goal: Absence or prevention of progression during hospitalization  Description  INTERVENTIONS:  - Assess and monitor for signs and symptoms of infection  - Monitor lab/diagnostic results  - Monitor all insertion sites-2 IV insertion sites and an indwelling murillo catheter     - Administer medications as ordered  - Instruct and encourage patient and family to use good hand hygiene technique   Outcome: Progressing     Problem: SAFETY ADULT  Goal: Maintain or return to baseline ADL function  Description  INTERVENTIONS:  -  Assess patient's ability to carry out ADLs; assess patient's baseline for ADL function and identify physical deficits which impact ability to perform ADLs (bathing, care of mouth/teeth, toileting, grooming, dressing, etc )  - Assess/evaluate cause of self-care deficits   - Assess range of motion  - Assess patient's mobility; RW and standby staff assist at this time     - Assess patient's need for assistive devices and provide as appropriate  - Encourage maximum independence but intervene and supervise when necessary  - Involve family in performance of ADLs  - Assess for home care needs following discharge   - Consider OT consult to assist with ADL evaluation and planning for discharge  - Provide patient education as appropriate   Outcome: Progressing  Goal: Maintain or return mobility status to optimal level  Description  INTERVENTIONS:  - Assess patient's baseline mobility status (ambulation, transfers, stairs, etc )    - Identify cognitive and physical deficits and behaviors that affect mobility  - Identify mobility aids required to assist with transfers and/or ambulation- RW and staff standby [address] [place] [address]/[facility] [facility] fall precautions as indicated by assessment  - Record patient progress and toleration of activity level on Mobility SBAR; progress patient to next Phase/Stage  - Instruct patient to call for assistance with activity based on assessment  - Consider rehabilitation consult to assist with strengthening/weightbearing, etc    Outcome: Progressing     Problem: DISCHARGE PLANNING  Goal: Discharge to home or other facility with appropriate resources  Description  INTERVENTIONS:  - Identify barriers to discharge w/patient and caregiver  - Arrange for needed discharge resources and transportation as appropriate  - Identify discharge learning needs (meds, wound care, etc )  - Arrange for interpretive services to assist at discharge as needed  - Refer to Case Management Department for coordinating discharge planning if the patient needs post-hospital services based on physician/advanced practitioner order or complex needs related to functional status, cognitive ability, or social support system  Outcome: Progressing     Problem: Knowledge Deficit  Goal: Patient/family/caregiver demonstrates understanding of disease process, treatment plan, medications, and discharge instructions  Description  Complete learning assessment and assess knowledge base  Interventions:  - Provide teaching at level of understanding  - Provide teaching via preferred learning methods  Outcome: Progressing     Problem: Nutrition/Hydration-ADULT  Goal: Nutrient/Hydration intake appropriate for improving, restoring or maintaining nutritional needs  Description  Monitor and assess patient's nutrition/hydration status for malnutrition  Collaborate with interdisciplinary team and initiate plan and interventions as ordered  Monitor patient's weight and dietary intake as ordered or per policy  Utilize nutrition screening tool and intervene as necessary  Determine patient's food preferences and provide high-protein, high-caloric foods as appropriate       INTERVENTIONS:  - Monitor oral intake, urinary output, labs, and treatment plans  - Assess nutrition and hydration status and recommend course of action  - Evaluate amount of meals eaten  - Assist patient with eating if necessary   - Allow adequate time for meals  - Recommend/ encourage appropriate diets, oral nutritional supplements, and vitamin/mineral supplements  - Assess need for intravenous fluids  - Provide specific nutrition/hydration education as appropriate  - Include patient/family/caregiver in decisions related to nutrition   Outcome: Progressing     Problem: Prexisting or High Potential for Compromised Skin Integrity  Goal: Skin integrity is maintained or improved  Description  INTERVENTIONS:  - Identify patients at risk for skin breakdown  - Assess and monitor skin integrity  - Assess and monitor nutrition and hydration status  - Monitor labs   - Assess for incontinence   - Turn and reposition patient  - Assist with mobility/ambulation  - Relieve pressure over bony prominences  - Avoid friction and shearing  - Provide appropriate hygiene as needed including keeping skin clean and dry  - Evaluate need for skin moisturizer/barrier cream  - Collaborate with interdisciplinary team   - Patient/family teaching  - Consider wound care consult   Outcome: Progressing

## 2019-09-16 NOTE — PROGRESS NOTES
Pt AAOx4 in bd  Pt offers no c/o pain at this time  All medications given and pt stated after antibiotic is finished she wants to get washed up and sit in her bedside chair  Pt advised to call for help with that  She verbalized understanding

## 2019-09-16 NOTE — DISCHARGE SUMMARY
Discharge Summary - [address] [facility]    Patient Information: Luis Vaughn 52 y o  female MRN: [id number]  Unit/Bed#: -01 Encounter: [id number]    Discharging Physician / Practitioner: Adriana Hutton MD  PCP: Sophie Jones MD  Admission Date: 9/14/2019  Discharge Date: 09/16/19    Reason for Admission:  Pyelonephritis/ Left flank pain secondary to left ureteric stone with hydronephrosis    Discharge Diagnoses:     Principal Problem:    Pyelonephritis  Active Problems:    Ureterolithiasis    CHF (congestive heart failure) ([facility] )    Type 2 diabetes mellitus, without long-term current use of insulin ([place] [address] )    Hydronephrosis concurrent with and due to calculi of kidney and ureter    Hypertension    Atrial fibrillation ([place] [address] )  Resolved Problems:    * No resolved hospital problems  *    Present on Admission:   Ureterolithiasis   Pyelonephritis   [facility]congest[facility]) [facility]   Type 2 diabetes mellitus, without long-term current use of insulin ([facility])   Hydronephrosis concurrent with and due to calculi of kidney and ureter   Hypertension   Atrial fibrillation [facility])    Consultations During Hospital Stay:  · Urology    Procedures Performed:     · CT abdomen/pelvis  · Fluoroscopic retrograde pyelogram  · Placement of left ureteral stent    Significant Findings:     CT abdomen/pelvis:    IMPRESSION:  1  Severely obstructing 10 mm distal left ureteric calculus approximately 6 cm above the ureterovesical junction  2   Enhancement of the left collecting system and ureter, suspicious for pyelonephritis  3   Nonobstructing 9 mm left lower pole renal calculus  4   Fatty infiltrative changes of the liver  Incidental Findings:   · As above     Test Results Pending at Discharge (will require follow up):    · None     Outpatient Tests Requested:  · None    Complications:  None    Hospital Course:     Luis Vaughn is a 52 y o  female patient who originally presented to the hospital on 9/14/2019 due to left-sided flank pain  She was found to have left-sided hydronephrosis with obstructing 10 mm distal left ureteric calculus  Also found to have signs and symptoms of pyelonephritis  Treated with IV antibiotics and pain medications  Seen by urology service and underwent urological procedure with significant improvement in her symptoms  She would be on oral antibiotics for few more days  Patient follow up with urology service on outpatient basis  Her Fritz catheter has been taken out and she has been voiding fine  Plan of care was discussed in detail with the patient  All questions were answered  Condition at Discharge: good     Discharge Day Visit / Exam:     Subjective:  Feels good  Wants to go home  Cultures were pending this morning  This came back and E coli pansensitive  She is also feeling otherwise good and has no other complaints  Vitals: Blood Pressure: 114/65 (09/16/19 0721)  Pulse: 72 (09/16/19 0721)  Temperature: 97 7 °F (36 5 °C) (09/16/19 0721)  Temp Source: Oral (09/16/19 0721)  Respirations: 17 (09/16/19 0721)  Height: 5' (152 4 cm) (09/16/19 1205)  Weight - Scale: 66 4 kg (146 lb 6 2 oz) (09/16/19 1205)  SpO2: 97 % (09/16/19 0721)  Exam:            Vital signs reviewed as above  Patient lying in bed and not in any distress  Oropharynx are hydrated  S1 and S2 audible  Appears to be in sinus rhythm  No cyanosis or clubbing  Abdomen is obese  Grossly nontender other than some soreness in flank  Bowel sounds are audible  Awake and alert  Oriented x3  EOM grossly intact  Conjunctiva slightly pale  Mood and affect are pleasant  Skin is warm and dry  Fritz has been taken out and she is voiding fine without any issues          Discharge instructions/Information to patient and family:   See after visit summary for information provided to patient and family        Provisions for Follow-Up Care:  See after visit summary for information related to follow-up care and any pertinent home health orders  Disposition:     Home    For Discharges to [facility] SNF:   · Not Applicable to this Patient - Not Applicable to this Patient    Planned Readmission:  None     Discharge Statement:  I spent 35+ minutes discharging the patient  This time was spent on the day of discharge  I had direct contact with the patient on the day of discharge  Greater than 50% of the total time was spent examining patient, answering all patient questions, arranging and discussing plan of care with patient as well as directly providing post-discharge instructions  Additional time then spent on discharge activities  Discharge Medications:  See after visit summary for reconciled discharge medications provided to patient and family  ** Please Note: Dragon 360 Dictation voice to text software may have been used in the creation of this document   **

## 2019-09-16 NOTE — PROGRESS NOTES
[facility] Progress Note  Patient: Jason Sizer 52 y o  female   MRN: [id number]  PCP: Corrinne Adolphus, MD  Unit/Bed#: [facility] [place] Encounter: [id number]  Date Of Visit: 09/16/19          * Pyelonephritis  Assessment & Plan  Status post stent placement  Pyelonephritis associated with stone  Switch antibiotics to oral   Final cultures are still pending  Could change antibiotics according to sensitivities if needed  Currently she is on Cipro as urine cultures growing E coli and hoping that it would be sensitive to it    Atrial fibrillation [facility])  Assessment & Plan  History of atrial fibrillation  Not on any anticoagulation chronically  Continue with Coreg  Appears to be in sinus rhythm    Hypertension  Assessment & Plan  Patient is on Losartan, Spironolactone, Carvedilol, Lasix  Hold all BP Meds for SBP < 110    Hydronephrosis concurrent with and due to calculi of kidney and ureter  Assessment & Plan  Status post stent placement as above  Fritz catheter has been taken out  Currently on voiding trials as per Urology  Type 2 diabetes mellitus, without long-term current use of insulin [facility])  Assessment & Plan  No results found for: HGBA1C    Recent Labs     09/15/19  1049 09/15/19  1532 09/15/19  2107 09/16/19  0619   POCGLU 102 113 125 149*       Blood Sugar Average: Last 72 hrs:  (P) 231 [id number]     Check hemoglobin A1c   Continue with current treatment    Accu-Cheks are fair    Ureterolithiasis  Assessment & Plan  Plan as above      Present on Admission:   Ureterolithiasis   CHF (congestive heart failure) (HCC)   Type 2 diabetes mellitus, without long-term current use of insulin (HCC)   Hydronephrosis concurrent with and due to calculi of kidney and ureter   Hypertension   Atrial fibrillation (HCC)            VTE Pharmacologic Prophylaxis:   Pharmacologic: Heparin  Mechanical VTE Prophylaxis in Place: Yes    Patient Centered Rounds: I have performed bedside rounds with nursing staff today  Discussions with Specialists or Other Care Team Provider:  Yes    Education and Discussions with Family / Patient:  Yes    Time Spent for Care: 30+ minutes  More than 50% of total time spent on counseling and coordination of care as described above  Current Length of Stay: 2 day(s)    Current Patient Status: Inpatient   Certification Statement: The patient will continue to require additional inpatient hospital stay due to UTI/pyelonephritis    Discharge Plan:  Home hopefully in the next day or so    Code Status: Level 1 - Full Code      Subjective:   She feels good  No chest pain or shortness of breath  She has some flank pain here and there off and on  She was taking Ultram at home  Denies any chest pain or shortness of breath  Denies any fever or chills  Her Fritz catheter was removed and as per patient, she is feeling fine after that and denies any significant pressure in her bladder at this moment  Objective:     Vitals:   Temp (24hrs), Av 4 °F (36 9 °C), Min:97 7 °F (36 5 °C), Max:99 4 °F (37 4 °C)    Temp:  [97 7 °F (36 5 °C)-99 4 °F (37 4 °C)] 97 7 °F (36 5 °C)  HR:  [72-89] 72  Resp:  [17-18] 17  BP: ()/(65-67) 114/65  SpO2:  [92 %-97 %] 97 %  Body mass index is 28 59 kg/m²  Input and Output Summary (last 24 hours): Intake/Output Summary (Last 24 hours) at 2019 1112  Last data filed at 2019 0913  Gross per 24 hour   Intake 1058 75 ml   Output 1400 ml   Net -341 25 ml           Physical Exam:     Vital signs reviewed as above  Patient lying in bed and not in any distress  Oropharynx are hydrated  S1 and S2 audible  Appears to be in sinus  No cyanosis or clubbing  Abdomen is obese  Grossly nontender other than some soreness in flank  Bowel sounds are audible  Awake and alert    Oriented x3  EOM grossly intact  Conjunctiva slightly pale  Mood and affect are pleasant  Skin is warm and dry  Fritz is out now    Additional Data: Labs:    Results from last 7 days   Lab Units 09/15/19  0453  09/14/19  0658   WBC Thousand/uL 5 34  --  9 69   HEMOGLOBIN g/dL 11 4*  --  13 8   HEMATOCRIT % 34 6*  --  41 3   PLATELETS Thousands/uL 188   < > 240   NEUTROS PCT %  --   --  87*   LYMPHS PCT %  --   --  7*   LYMPHO PCT % 9*  --   --    MONOS PCT %  --   --  4   MONO PCT % 4  --   --    EOS PCT % 1  --  2    < > = values in this interval not displayed  Results from last 7 days   Lab Units 09/15/19  0453  09/14/19  0658   POTASSIUM mmol/L 3 5   < > 3 3*   CHLORIDE mmol/L 100   < > 96*   CO2 mmol/L 26   < > 27   BUN mg/dL 12   < > 15   CREATININE mg/dL 0 98   < > 1 21   CALCIUM mg/dL 8 9   < > 9 3   ALK PHOS U/L  --   --  54   ALT U/L  --   --  15   AST U/L  --   --  10    < > = values in this interval not displayed  Results from last 7 days   Lab Units 09/14/19  0829   INR  1 13       * I Have Reviewed All Lab Data Listed Above  * Additional Pertinent Lab Tests Reviewed: All Labs Within Last 24 Hours Reviewed      Recent Cultures (last 7 days):     Results from last 7 days   Lab Units 09/14/19  0949 09/14/19  0845 09/14/19  0829 09/14/19  0739   BLOOD CULTURE   --  No Growth at 24 hrs   No Growth at 24 hrs   --    URINE CULTURE  50,000-59,000 cfu/ml Escherichia coli*  --   --  >100,000 cfu/ml Escherichia coli*       Last 24 Hours Medication List:     Current Facility-Administered Medications:  acetaminophen 650 mg Oral Q6H PRN Saida Pennington MD   aspirin 81 mg Oral Daily Saida Pennington MD   atorvastatin 10 mg Oral Daily Saida Pennington MD   carvedilol 6 25 mg Oral BID With Meals Saida Pennington MD   ciprofloxacin 500 mg Oral BID June Soto MD   fenofibrate 145 mg Oral Daily Saida Pennington MD   furosemide 40 mg Oral Daily Saida Pennington MD   heparin (porcine) 5,000 Units Subcutaneous [facility] Saida Pennington MD   insulin lispro 1-5 Units Subcutaneous TID AC Saida Pennington MD   losartan 100 mg Oral Daily Saida Pennington MD   morphine injection 2 mg Intravenous Q6H PRN Adriana Malone MD   nicotine 1 patch Transdermal Daily Adriana Malone MD   pantoprazole 40 mg Oral Daily Adriana Malone MD   spironolactone 25 mg Oral Daily Adriana Malone MD        Today, Patient Was Seen By: Carri Smith MD    ** Please Note: Dragon 360 Dictation voice to text software may have been used in the creation of this document   **

## 2019-09-16 NOTE — PROGRESS NOTES
UROLOGY PROGRESS NOTE   Patient Identifiers: Gladys Ortiz (MRN [id number])  Date of Service: 9/16/2019        Assessment:       1  1 cm obstructing left distal ureteral calculus  -  9 mm lower pole stone     2  Urosepsis  -  Improving following stent placement       it is good to see Reina Mckenzie  Doing well clinically following stent placement  Urine culture with gram-negative rods, speciation and sensitivities   Still pending    Plan:   -  Given the patient's severe infection, medical comorbidities including diabetes, I recommended she remained in patient on broad-spectrum antibiotics until her culture data begins to finalize  She is amenable to this  - patient can then be discharged home  She will need a total 14 day course of culture directed antibiotics  - I requested follow-up in our office as a preoperative visit  She understands she will need an additional ureteroscopy to address both her obstructing ureteral calculus as well as the large stone within the left kidney  - Urology to sign off  Thank you for allowing us participate in patient's care  Please do not hesitate contact an additional questions during hospital stay        Subjective:     24 HR EVENTS:   no significant events  Patient has  no complaints        Objective:     VITALS:    Vitals:    09/16/19 0721   BP: 114/65   Pulse: 72   Resp: 17   Temp: 97 7 °F (36 5 °C)   SpO2: 97%       INS & OUTS:  [unfilled]    LABS:  Lab Results   Component Value Date    HGB 11 4 (L) 09/15/2019    HCT 34 6 (L) 09/15/2019    WBC 5 34 09/15/2019     09/15/2019   ]    Lab Results   Component Value Date    K 3 5 09/15/2019     09/15/2019    CO2 26 09/15/2019    BUN 12 09/15/2019    CREATININE 0 98 09/15/2019    CALCIUM 8 9 09/15/2019   ]    INPATIENT MEDS:    Current Facility-Administered Medications:     acetaminophen (TYLENOL) tablet 650 mg, 650 mg, Oral, Q6H PRN, Tae Plasencia MD    aspirin chewable tablet 81 mg, 81 mg, Oral, Daily, Jeff Phoenix MD, 81 mg at 09/15/19 0840    atorvastatin (LIPITOR) tablet 10 mg, 10 mg, Oral, Daily, Jeff Phoenix MD, 10 mg at 09/15/19 0841    carvedilol (COREG) tablet 6 25 mg, 6 25 mg, Oral, BID With Meals, Jeff Phoenix MD    cefepime (MAXIPIME) 2,000 mg in dextrose 5 % 50 mL IVPB, 2,000 mg, Intravenous, Q12H, eJff Phoenix MD, Last Rate: 100 mL/hr at 09/15/19 2031, 2,000 mg at 09/15/19 2031    fenofibrate (TRICOR) tablet 145 mg, 145 mg, Oral, Daily, Jeff Phoenix MD, 145 mg at 09/15/19 0840    furosemide (LASIX) tablet 40 mg, 40 mg, Oral, Daily, Jeff Phoenix MD, 40 mg at 09/14/19 1304    heparin (porcine) subcutaneous injection 5,000 Units, 5,000 Units, Subcutaneous, Q8H [facility], 5,000 Units at 09/16/19 0625 **AND** [COMPLETED] Platelet count, , , Once, Jeff Phoenix MD    insulin lispro (HumaLOG) 100 units/mL subcutaneous injection 1-5 Units, 1-5 Units, Subcutaneous, TID AC **AND** Fingerstick Glucose (POCT), , , TID AC, Jeff Phoeinx MD    losartan (COZAAR) tablet 100 mg, 100 mg, Oral, Daily, Jeff Phoenix MD, 100 mg at 09/14/19 1303    morphine injection 2 mg, 2 mg, Intravenous, Q6H PRN, Jeff Phoenix MD, 2 mg at 09/16/19 0630    nicotine (NICODERM CQ) 7 mg/24hr TD 24 hr patch 1 patch, 1 patch, Transdermal, Daily, Jeff Phoenix MD, 1 patch at 09/15/19 0840    pantoprazole (PROTONIX) EC tablet 40 mg, 40 mg, Oral, Daily, Jeff Phoenix MD, 40 mg at 09/15/19 0840    spironolactone (ALDACTONE) tablet 25 mg, 25 mg, Oral, Daily, Jeff Phoenix MD, 25 mg at 09/14/19 1304      Physical Exam:   /65   Pulse 72   Temp 97 7 °F (36 5 °C)   Resp 17   Ht 5' (1 524 m)   Wt 66 4 kg (146 lb 6 2 oz)   SpO2 97%   BMI 28 59 kg/m²   GEN: resting comfortably  RESP: breathing comfortably with no accessory muscle use  CV: no significant peripheral edema  ABD: soft and appropriately tender to palpation  INCISION: none  DRAINS: none  BAUMANN: none

## 2019-09-17 LAB
ATRIAL RATE: 103 BPM
P AXIS: 42 DEGREES
PR INTERVAL: 194 MS
QRS AXIS: 30 DEGREES
QRSD INTERVAL: 96 MS
QT INTERVAL: 366 MS
QTC INTERVAL: 479 MS
T WAVE AXIS: 67 DEGREES
VENTRICULAR RATE: 103 BPM

## 2019-09-17 PROCEDURE — 93010 ELECTROCARDIOGRAM REPORT: CPT | Performed by: INTERNAL MEDICINE

## 2019-09-17 NOTE — TELEPHONE ENCOUNTER
Called and left message for patient to call the office back to schedule appointment  Office number was left in the message  Patient tentatively scheduled for 9/25/19 at 9:15 with Poly Garcia at the [facility] office

## 2019-09-18 NOTE — TELEPHONE ENCOUNTER
Called and spoke to patient  Made her aware she is scheduled for 9/25/19 at 9:15 with Ova Pop at the [facility] office  Gave patient address to office and directions  Post Op Note    Gabriel Person is a 52 y o  female s/p CYSTOSCOPY RETROGRADE PYELOGRAM WITH INSERTION STENT URETERAL (Left Bladder) performed 09/18/2019  Gabriel Person is a patient of Dr Grace Smith and is seen at the [facility] office  How would you rate your pain on a scale from 1 to 10, 10 being the worst pain ever? 1  Have you had a fever? No  If so, what was your highest temperature? n/a F What is your current temperature? n/a F  Have your bowel movements been regular? Yes  Do you have any difficulty urinating? No  If the patient has an incision- do you have any redness around the incision or any drainage from the incision? N/a  If the patient has a drain- are you having any issues with the drain? No  If the patient has a murillo- are you comfortable caring for your murillo? No Is it draining urine? No  Do you have any other questions or concerns that I can address at this time? No  Reviewed stent pain and recommendations to help with this

## 2019-09-19 LAB
BACTERIA BLD CULT: NORMAL
BACTERIA BLD CULT: NORMAL

## 2019-09-25 ENCOUNTER — OFFICE VISIT (OUTPATIENT)
Dept: UROLOGY | Facility: CLINIC | Age: 54
End: 2019-09-25
Payer: COMMERCIAL

## 2019-09-25 ENCOUNTER — TELEPHONE (OUTPATIENT)
Dept: UROLOGY | Facility: MEDICAL CENTER | Age: 54
End: 2019-09-25

## 2019-09-25 VITALS
HEIGHT: 60 IN | WEIGHT: 136.2 LBS | HEART RATE: 68 BPM | BODY MASS INDEX: 26.74 KG/M2 | DIASTOLIC BLOOD PRESSURE: 84 MMHG | SYSTOLIC BLOOD PRESSURE: 112 MMHG

## 2019-09-25 DIAGNOSIS — N20.1 URETERAL STONE: ICD-10-CM

## 2019-09-25 DIAGNOSIS — N12 PYELONEPHRITIS: Primary | ICD-10-CM

## 2019-09-25 LAB
SL AMB  POCT GLUCOSE, UA: NORMAL
SL AMB LEUKOCYTE ESTERASE,UA: NORMAL
SL AMB POCT BLOOD,UA: NORMAL
SL AMB POCT CLARITY,UA: NORMAL
SL AMB POCT COLOR,UA: YELLOW
SL AMB POCT KETONES,UA: NORMAL
SL AMB POCT NITRITE,UA: NORMAL
SL AMB POCT PH,UA: 6
SL AMB POCT SPECIFIC GRAVITY,UA: 1.01
SL AMB POCT URINE PROTEIN: NORMAL

## 2019-09-25 PROCEDURE — 99214 OFFICE O/P EST MOD 30 MIN: CPT | Performed by: PHYSICIAN ASSISTANT

## 2019-09-25 PROCEDURE — 81002 URINALYSIS NONAUTO W/O SCOPE: CPT | Performed by: PHYSICIAN ASSISTANT

## 2019-09-25 PROCEDURE — 87086 URINE CULTURE/COLONY COUNT: CPT | Performed by: PHYSICIAN ASSISTANT

## 2019-09-25 NOTE — PROGRESS NOTES
1  Pyelonephritis  POCT urine dip   2  Ureteral stone  Case request operating room: CYSTOSCOPY URETEROSCOPY WITH LITHOTRIPSY HOLMIUM LASER, RETROGRADE PYELOGRAM AND INSERTION STENT URETERAL    Case request operating room: CYSTOSCOPY URETEROSCOPY WITH LITHOTRIPSY HOLMIUM LASER, RETROGRADE PYELOGRAM AND INSERTION STENT URETERAL         Assessment and plan:       1  nephrolithiasis status post left ureteral stent placement (09/14/2019) - managed by Dr Keny Sands  - I reviewed with the patient that she will require secondary surgery in the form of cystoscopy, left ureteroscopy, holmium laser, basket extraction, retrograde pyelogram, and left ureteral stent placement  We did discuss that this will try to tackle her ureteral stone as well as her left intrarenal calculus  We did discuss the risk given her the large nature her stone needing additional procedures and not being stone free  We discussed the risks of procedure including but not limited to cardiopulmonary complications, bleeding, infection, damage to nearby structures, need for nephrostomy tube, ureteral stricture  - patient's urine will be submitted from the office today for culture  She will be contacted should antibiotics be warranted  - patient verbalized understanding  All questions answered  Ike Ingram PA-C      Chief Complaint     Chief Complaint   Patient presents with    Nephrolithiasis         History of Present Illness     Gladys Ortiz is a 52 y o  female patient of Dr Keny Sands with a history of nephrolithiasis status post left ureteral stent placement (09/14/2019) presenting for follow-up  Patient was seen in consultation 09/14/2019 on the hospital due to a 1 cm distal left ureteral calculus with severe obstruction and pyelonephritis  She underwent cystoscopy, retrograde pyelogram, left ureteral stent placement (09/14/2019)  FINDINGS:  The large left distal calculus was radiopaque    Severe hydroureteronephrosis noted on retrograde pyelogram   The stent was able to be placed successfully to drain the kidney  There was a hydronephrotic drip of frankly purulent urine drained from the kidney  Patient remained hemodynamically stable throughout the procedure and postoperatively        She does have a history of recurrent nephrolithiasis, however has never previously required surgical intervention  Patient also has a 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 with flap placement as well as pelvic radiation  Patient did complete a course of ciprofloxacin with her last pill being yesterday  Laboratory     Lab Results   Component Value Date    CREATININE 0 98 09/15/2019       Review of Systems     Review of Systems   Constitutional: Negative for activity change, appetite change, chills, diaphoresis, fatigue, fever and unexpected weight change  Respiratory: Negative for chest tightness and shortness of breath  Cardiovascular: Negative for chest pain, palpitations and leg swelling  Gastrointestinal: Negative for abdominal distention, abdominal pain, constipation, diarrhea, nausea and vomiting  Genitourinary: Negative for decreased urine volume, difficulty urinating, dysuria, enuresis, flank pain, frequency, genital sores, hematuria and urgency  Musculoskeletal: Negative for back pain, gait problem and myalgias  Skin: Negative for color change, pallor, rash and wound  Psychiatric/Behavioral: Negative for behavioral problems  The patient is not nervous/anxious  Allergies     Allergies   Allergen Reactions    Penicillins Hives       Physical Exam     Physical Exam   Constitutional: She is oriented to person, place, and time  She appears well-developed and well-nourished  No distress  HENT:   Head: Normocephalic and atraumatic  Right Ear: External ear normal    Left Ear: External ear normal    Nose: Nose normal    Eyes: Conjunctivae are normal  No scleral icterus  Neck: Normal range of motion  No tracheal deviation present  Cardiovascular: Normal rate, regular rhythm and normal heart sounds  Exam reveals no gallop and no friction rub  No murmur heard  Pulmonary/Chest: Effort normal  No stridor  No respiratory distress  She has no wheezes  Musculoskeletal: Normal range of motion  She exhibits no edema or deformity  Neurological: She is alert and oriented to person, place, and time  Skin: Skin is warm and dry  She is not diaphoretic  No erythema  No pallor  Psychiatric: She has a normal mood and affect   Her behavior is normal          Vital Signs     Vitals:    09/25/19 0929   BP: 112/84   Pulse: 68   Weight: 61 8 kg (136 lb 3 2 oz)   Height: 5' (1 524 m)         Current Medications       Current Outpatient Medications:     aspirin 81 mg chewable tablet, Chew 81 mg daily, Disp: , Rfl:     atorvastatin (LIPITOR) 10 mg tablet, Take 10 mg by mouth daily, Disp: , Rfl:     carvedilol (COREG) 6 25 mg tablet, Take 6 25 mg by mouth, Disp: , Rfl:     fenofibrate micronized (LOFIBRA) 134 MG capsule, Take 134 mg by mouth every morning before breakfast, Disp: , Rfl:     furosemide (LASIX) 40 mg tablet, TAKE 1 TABLET BY MOUTH EVERY DAY, Disp: , Rfl:     Icosapent Ethyl (VASCEPA) 1 g CAPS, Take 1 capsule by mouth 2 (two) times a day, Disp: , Rfl:     losartan (COZAAR) 100 MG tablet, Take 100 mg by mouth daily, Disp: , Rfl:     pantoprazole (PROTONIX) 40 mg tablet, Take 40 mg by mouth daily, Disp: , Rfl:     Semaglutide (OZEMPIC) 0 25 or 0 5 MG/DOSE SOPN, Take 0 25 mg by mouth, Disp: , Rfl:     spironolactone (ALDACTONE) 25 mg tablet, TAKE 1 TABLET BY MOUTH EVERY DAY, Disp: , Rfl:     traMADol (ULTRAM) 50 mg tablet, Take 50 mg by mouth every 6 (six) hours as needed for moderate pain, Disp: , Rfl:     valACYclovir (VALTREX) 1,000 mg tablet, Take 1,000 mg by mouth 2 (two) times a day, Disp: , Rfl:     vitamin B-12 (VITAMIN B-12) 1,000 mcg tablet, Take 1,000 mcg by mouth daily, Disp: , Rfl:       Active Problems     Patient Active Problem List   Diagnosis    Ureterolithiasis    Pyelonephritis    CHF (congestive heart failure) (HCC)    Type 2 diabetes mellitus, without long-term current use of insulin (HCC)    Hydronephrosis concurrent with and due to calculi of kidney and ureter    Hypertension    Atrial fibrillation [facility])         Past Medical History     Past Medical History:   Diagnosis Date    [facility]fib [facility] )     Diabetes mellitus ([facility] )     Hypertension          Surgical History     Past Surgical History:   Procedure Laterality Date    CARDIAC DEFIBRILLATOR PLACEMENT      FL RETROGRADE PYELOGRAM  9/14/2019    NE CYSTOURETHROSCOPY,URETER CATHETER Left 9/14/2019    Procedure: CYSTOSCOPY RETROGRADE PYELOGRAM WITH INSERTION STENT URETERAL;  Surgeon: Lucas Farr MD;  Location: [facility] OR;  Service: Urology         Family History     History reviewed  No pertinent family history        Social History     Social History       Radiology

## 2019-09-25 NOTE — TELEPHONE ENCOUNTER
I spoke to the patient and confirmed the 10/31/19 surgery date arranged by Catherine Ayala at [facility] with dr Trina Dougherty     -Instructions given verbally and mailed  -patient aware that she will need to stop her aspirin 7 days prior   -no auth or clearance required

## 2019-09-26 LAB — BACTERIA UR CULT: NORMAL

## 2019-10-18 NOTE — TELEPHONE ENCOUNTER
I spoke to the patient, she did not have a referral for her 9/25/19 office visit ([employer] referral will be a consult and treat)  Patient stated that she will call her PCP to set up an appointment and obtain a referral, it has been to long since her last visit for a cold call referral  Patient is aware that a referral is required for her surgery to remain scheduled for 10/31/19

## 2019-10-30 NOTE — TELEPHONE ENCOUNTER
This is a patient with a large ureteral stone and indwelling ureteral stent  Her surgical intervention is absolutely medical necessary and lets continue to assist her with whatever documentation is required in order for her to receive prompt surgical intervention    If it is deemed necessary to cancel her surgery as scheduled for tomorrow this needs to be rescheduled ASAP

## 2019-10-30 NOTE — TELEPHONE ENCOUNTER
I spoke to patients PCP office, patient never contacted them for the referral/has not been seen  Canceled her last visit with them  I called and left a voice mail message on the patient's phone advising needing a call back ASAP  I spoke to the patients daughter and she will try to contact the patient to call me ASAP

## 2019-10-30 NOTE — TELEPHONE ENCOUNTER
**return call from patient @ 10:44AM , stating that she didnt know she needed a referral has never been told she needs a referral has never read her card were it states that she needs a referral  I reminded her of our conversation 10/18/19 and that I informed her a referral is needed  She did then acknowledge that she forgot to call and make the arrangements for a visit or referral  Surgery for tomorrow canceled  Patient will call her PCP to schedule an appointment [place]  a referral and call back to reschedule surgery once this has been done  **     ** Per 2nd yobani call @ 10:50AMp patient is scheduled to see the PCP this Friday to re establish care  Patient has my direct fax # to have PCP send the referral to me once seen  She is aware to request a post dated referral to hopefully cover her 9/25/19 office visit as well  **

## 2019-10-31 NOTE — TELEPHONE ENCOUNTER
Patient has an appointment with Her PCP Friday 11/1/19 and is planning to have the referral faxed to my direct fax# before leaving the visit so we can reschedule her as soon as possible   Pre Encounter would not allow the scheduled date to remain without the referral

## 2019-11-01 PROBLEM — N20.1 CALCULUS OF URETER: Status: ACTIVE | Noted: 2019-11-01

## 2019-11-01 NOTE — TELEPHONE ENCOUNTER
I received the [employer] referral from the PCP office  Scheduled 1st available saved procedure 11/14/19 @ [facility] with Dr Gonsalo Martinez  I attempted to call the patient on her provided # X3, records states call can not be completed at this time  I will try again this afternoon  New instruction packet mailed with Urine C&S script

## 2019-11-07 NOTE — PRE-PROCEDURE INSTRUCTIONS
Pre-Surgery Instructions:   Medication Instructions    aspirin 81 mg chewable tablet Instructed patient per Anesthesia Guidelines   atorvastatin (LIPITOR) 10 mg tablet Instructed patient per Anesthesia Guidelines   carvedilol (COREG) 6 25 mg tablet Pt advised to take morning of surgery with small sip of water    fenofibrate micronized (LOFIBRA) 134 MG capsule Instructed patient per Anesthesia Guidelines   furosemide (LASIX) 40 mg tablet Instructed patient per Anesthesia Guidelines   Icosapent Ethyl (VASCEPA) 1 g CAPS Instructed patient per Anesthesia Guidelines   losartan (COZAAR) 100 MG tablet Instructed patient per Anesthesia Guidelines   pantoprazole (PROTONIX) 40 mg tablet Pt advised to take morning of surgery with small sip of water    Semaglutide (OZEMPIC) 0 25 or 0 5 MG/DOSE SOPN Instructed patient per Anesthesia Guidelines   spironolactone (ALDACTONE) 25 mg tablet Instructed patient per Anesthesia Guidelines   traMADol (ULTRAM) 50 mg tablet Instructed patient per Anesthesia Guidelines   valACYclovir (VALTREX) 1,000 mg tablet Instructed patient per Anesthesia Guidelines   vitamin B-12 (VITAMIN B-12) 1,000 mcg tablet Instructed patient per Anesthesia Guidelines

## 2019-11-14 ENCOUNTER — ANESTHESIA (OUTPATIENT)
Dept: PERIOP | Facility: HOSPITAL | Age: 54
End: 2019-11-14
Payer: COMMERCIAL

## 2019-11-14 ENCOUNTER — TELEPHONE (OUTPATIENT)
Dept: UROLOGY | Facility: CLINIC | Age: 54
End: 2019-11-14

## 2019-11-14 ENCOUNTER — ANESTHESIA EVENT (OUTPATIENT)
Dept: PERIOP | Facility: HOSPITAL | Age: 54
End: 2019-11-14
Payer: COMMERCIAL

## 2019-11-14 ENCOUNTER — APPOINTMENT (OUTPATIENT)
Dept: RADIOLOGY | Facility: HOSPITAL | Age: 54
End: 2019-11-14
Payer: COMMERCIAL

## 2019-11-14 ENCOUNTER — HOSPITAL ENCOUNTER (OUTPATIENT)
Facility: HOSPITAL | Age: 54
Setting detail: OUTPATIENT SURGERY
Discharge: HOME | End: 2019-11-14
Attending: UROLOGY | Admitting: UROLOGY
Payer: COMMERCIAL

## 2019-11-14 VITALS
TEMPERATURE: 97.8 F | RESPIRATION RATE: 18 BRPM | DIASTOLIC BLOOD PRESSURE: 73 MMHG | HEIGHT: 60 IN | HEART RATE: 81 BPM | SYSTOLIC BLOOD PRESSURE: 142 MMHG | BODY MASS INDEX: 27.31 KG/M2 | WEIGHT: 139.11 LBS | OXYGEN SATURATION: 95 %

## 2019-11-14 DIAGNOSIS — N20.1 URETEROLITHIASIS: Primary | ICD-10-CM

## 2019-11-14 DIAGNOSIS — N20.1 CALCULUS OF URETER: ICD-10-CM

## 2019-11-14 DIAGNOSIS — N20.0 NEPHROLITHIASIS: Primary | ICD-10-CM

## 2019-11-14 PROBLEM — N12 PYELONEPHRITIS: Status: RESOLVED | Noted: 2019-09-14 | Resolved: 2019-11-14

## 2019-11-14 LAB — GLUCOSE SERPL-MCNC: 93 MG/DL (ref 65–140)

## 2019-11-14 PROCEDURE — 74420 UROGRAPHY RTRGR +-KUB: CPT

## 2019-11-14 PROCEDURE — C1758 CATHETER, URETERAL: HCPCS | Performed by: UROLOGY

## 2019-11-14 PROCEDURE — 82360 CALCULUS ASSAY QUANT: CPT | Performed by: UROLOGY

## 2019-11-14 PROCEDURE — 52356 CYSTO/URETERO W/LITHOTRIPSY: CPT | Performed by: UROLOGY

## 2019-11-14 PROCEDURE — NC001 PR NO CHARGE: Performed by: UROLOGY

## 2019-11-14 PROCEDURE — C1769 GUIDE WIRE: HCPCS | Performed by: UROLOGY

## 2019-11-14 PROCEDURE — C2617 STENT, NON-COR, TEM W/O DEL: HCPCS | Performed by: UROLOGY

## 2019-11-14 PROCEDURE — 82948 REAGENT STRIP/BLOOD GLUCOSE: CPT

## 2019-11-14 DEVICE — INLAY OPTIMA URETERAL STENT W/O GUIDEWIRE
Type: IMPLANTABLE DEVICE | Status: FUNCTIONAL
Brand: BARD® INLAY OPTIMA® URETERAL STENT

## 2019-11-14 RX ORDER — DEXAMETHASONE SODIUM PHOSPHATE 4 MG/ML
INJECTION, SOLUTION INTRA-ARTICULAR; INTRALESIONAL; INTRAMUSCULAR; INTRAVENOUS; SOFT TISSUE AS NEEDED
Status: DISCONTINUED | OUTPATIENT
Start: 2019-11-14 | End: 2019-11-14 | Stop reason: SURG

## 2019-11-14 RX ORDER — SULFAMETHOXAZOLE AND TRIMETHOPRIM 800; 160 MG/1; MG/1
1 TABLET ORAL EVERY 12 HOURS SCHEDULED
Qty: 6 TABLET | Refills: 0 | Status: SHIPPED | OUTPATIENT
Start: 2019-11-14 | End: 2019-11-17

## 2019-11-14 RX ORDER — SODIUM CHLORIDE, SODIUM LACTATE, POTASSIUM CHLORIDE, CALCIUM CHLORIDE 600; 310; 30; 20 MG/100ML; MG/100ML; MG/100ML; MG/100ML
125 INJECTION, SOLUTION INTRAVENOUS CONTINUOUS
Status: DISCONTINUED | OUTPATIENT
Start: 2019-11-14 | End: 2019-11-14 | Stop reason: HOSPADM

## 2019-11-14 RX ORDER — TAMSULOSIN HYDROCHLORIDE 0.4 MG/1
0.4 CAPSULE ORAL
Qty: 14 CAPSULE | Refills: 0 | Status: SHIPPED | OUTPATIENT
Start: 2019-11-14 | End: 2019-11-28

## 2019-11-14 RX ORDER — OXYCODONE HYDROCHLORIDE AND ACETAMINOPHEN 5; 325 MG/1; MG/1
1 TABLET ORAL EVERY 6 HOURS PRN
Qty: 12 TABLET | Refills: 0 | Status: SHIPPED | OUTPATIENT
Start: 2019-11-14 | End: 2019-11-19

## 2019-11-14 RX ORDER — SODIUM CHLORIDE, SODIUM LACTATE, POTASSIUM CHLORIDE, CALCIUM CHLORIDE 600; 310; 30; 20 MG/100ML; MG/100ML; MG/100ML; MG/100ML
INJECTION, SOLUTION INTRAVENOUS CONTINUOUS PRN
Status: DISCONTINUED | OUTPATIENT
Start: 2019-11-14 | End: 2019-11-14 | Stop reason: SURG

## 2019-11-14 RX ORDER — MIDAZOLAM HYDROCHLORIDE 2 MG/2ML
INJECTION, SOLUTION INTRAMUSCULAR; INTRAVENOUS AS NEEDED
Status: DISCONTINUED | OUTPATIENT
Start: 2019-11-14 | End: 2019-11-14 | Stop reason: SURG

## 2019-11-14 RX ORDER — OXYCODONE HYDROCHLORIDE AND ACETAMINOPHEN 5; 325 MG/1; MG/1
2 TABLET ORAL EVERY 6 HOURS PRN
Status: DISCONTINUED | OUTPATIENT
Start: 2019-11-14 | End: 2019-11-14 | Stop reason: HOSPADM

## 2019-11-14 RX ORDER — DOCUSATE SODIUM 100 MG/1
100 CAPSULE, LIQUID FILLED ORAL 3 TIMES DAILY
Qty: 90 CAPSULE | Refills: 0 | Status: SHIPPED | OUTPATIENT
Start: 2019-11-14 | End: 2019-11-21

## 2019-11-14 RX ORDER — DIPHENHYDRAMINE HCL 25 MG
25 TABLET ORAL EVERY 6 HOURS PRN
Status: DISCONTINUED | OUTPATIENT
Start: 2019-11-14 | End: 2019-11-14 | Stop reason: HOSPADM

## 2019-11-14 RX ORDER — PROPOFOL 10 MG/ML
INJECTION, EMULSION INTRAVENOUS AS NEEDED
Status: DISCONTINUED | OUTPATIENT
Start: 2019-11-14 | End: 2019-11-14 | Stop reason: SURG

## 2019-11-14 RX ORDER — MAGNESIUM HYDROXIDE 1200 MG/15ML
LIQUID ORAL AS NEEDED
Status: DISCONTINUED | OUTPATIENT
Start: 2019-11-14 | End: 2019-11-14 | Stop reason: HOSPADM

## 2019-11-14 RX ORDER — NICOTINE 21 MG/24HR
1 PATCH, TRANSDERMAL 24 HOURS TRANSDERMAL DAILY
Qty: 28 PATCH | Refills: 0 | Status: SHIPPED | OUTPATIENT
Start: 2019-11-14

## 2019-11-14 RX ORDER — ACETAMINOPHEN 325 MG/1
650 TABLET ORAL EVERY 6 HOURS PRN
Status: DISCONTINUED | OUTPATIENT
Start: 2019-11-14 | End: 2019-11-14 | Stop reason: HOSPADM

## 2019-11-14 RX ORDER — CIPROFLOXACIN 2 MG/ML
400 INJECTION, SOLUTION INTRAVENOUS ONCE
Status: COMPLETED | OUTPATIENT
Start: 2019-11-14 | End: 2019-11-14

## 2019-11-14 RX ORDER — CEFAZOLIN SODIUM 1 G/50ML
1000 SOLUTION INTRAVENOUS ONCE
Status: DISCONTINUED | OUTPATIENT
Start: 2019-11-14 | End: 2019-11-14

## 2019-11-14 RX ORDER — LIDOCAINE HYDROCHLORIDE 10 MG/ML
INJECTION, SOLUTION EPIDURAL; INFILTRATION; INTRACAUDAL; PERINEURAL AS NEEDED
Status: DISCONTINUED | OUTPATIENT
Start: 2019-11-14 | End: 2019-11-14 | Stop reason: SURG

## 2019-11-14 RX ORDER — ONDANSETRON 4 MG/1
4 TABLET, FILM COATED ORAL EVERY 8 HOURS PRN
Qty: 20 TABLET | Refills: 0 | Status: SHIPPED | OUTPATIENT
Start: 2019-11-14

## 2019-11-14 RX ORDER — ONDANSETRON 2 MG/ML
4 INJECTION INTRAMUSCULAR; INTRAVENOUS EVERY 6 HOURS PRN
Status: DISCONTINUED | OUTPATIENT
Start: 2019-11-14 | End: 2019-11-14 | Stop reason: HOSPADM

## 2019-11-14 RX ORDER — FENTANYL CITRATE 50 UG/ML
INJECTION, SOLUTION INTRAMUSCULAR; INTRAVENOUS AS NEEDED
Status: DISCONTINUED | OUTPATIENT
Start: 2019-11-14 | End: 2019-11-14 | Stop reason: SURG

## 2019-11-14 RX ORDER — OXYBUTYNIN CHLORIDE 5 MG/1
5 TABLET ORAL 2 TIMES DAILY PRN
Qty: 60 TABLET | Refills: 0 | Status: SHIPPED | OUTPATIENT
Start: 2019-11-14 | End: 2019-12-14

## 2019-11-14 RX ORDER — ONDANSETRON 2 MG/ML
INJECTION INTRAMUSCULAR; INTRAVENOUS AS NEEDED
Status: DISCONTINUED | OUTPATIENT
Start: 2019-11-14 | End: 2019-11-14 | Stop reason: SURG

## 2019-11-14 RX ORDER — FUROSEMIDE 10 MG/ML
INJECTION INTRAMUSCULAR; INTRAVENOUS AS NEEDED
Status: DISCONTINUED | OUTPATIENT
Start: 2019-11-14 | End: 2019-11-14 | Stop reason: SURG

## 2019-11-14 RX ORDER — KETOROLAC TROMETHAMINE 30 MG/ML
15 INJECTION, SOLUTION INTRAMUSCULAR; INTRAVENOUS EVERY 6 HOURS PRN
Status: DISCONTINUED | OUTPATIENT
Start: 2019-11-14 | End: 2019-11-14 | Stop reason: HOSPADM

## 2019-11-14 RX ORDER — FENTANYL CITRATE/PF 50 MCG/ML
25 SYRINGE (ML) INJECTION
Status: DISCONTINUED | OUTPATIENT
Start: 2019-11-14 | End: 2019-11-14 | Stop reason: HOSPADM

## 2019-11-14 RX ADMIN — FENTANYL CITRATE 25 MCG: 50 INJECTION, SOLUTION INTRAMUSCULAR; INTRAVENOUS at 14:52

## 2019-11-14 RX ADMIN — FENTANYL CITRATE 25 MCG: 50 INJECTION, SOLUTION INTRAMUSCULAR; INTRAVENOUS at 13:12

## 2019-11-14 RX ADMIN — CIPROFLOXACIN: 2 INJECTION INTRAVENOUS at 12:50

## 2019-11-14 RX ADMIN — MIDAZOLAM HYDROCHLORIDE 2 MG: 1 INJECTION, SOLUTION INTRAMUSCULAR; INTRAVENOUS at 12:55

## 2019-11-14 RX ADMIN — PROPOFOL 160 MG: 10 INJECTION, EMULSION INTRAVENOUS at 12:59

## 2019-11-14 RX ADMIN — FENTANYL CITRATE 25 MCG: 50 INJECTION, SOLUTION INTRAMUSCULAR; INTRAVENOUS at 13:07

## 2019-11-14 RX ADMIN — DEXAMETHASONE SODIUM PHOSPHATE 4 MG: 4 INJECTION, SOLUTION INTRAMUSCULAR; INTRAVENOUS at 13:06

## 2019-11-14 RX ADMIN — FENTANYL CITRATE 25 MCG: 50 INJECTION, SOLUTION INTRAMUSCULAR; INTRAVENOUS at 14:45

## 2019-11-14 RX ADMIN — FENTANYL CITRATE 25 MCG: 50 INJECTION, SOLUTION INTRAMUSCULAR; INTRAVENOUS at 15:00

## 2019-11-14 RX ADMIN — FENTANYL CITRATE 25 MCG: 50 INJECTION, SOLUTION INTRAMUSCULAR; INTRAVENOUS at 14:48

## 2019-11-14 RX ADMIN — KETOROLAC TROMETHAMINE 15 MG: 30 INJECTION, SOLUTION INTRAMUSCULAR at 15:15

## 2019-11-14 RX ADMIN — SODIUM CHLORIDE, SODIUM LACTATE, POTASSIUM CHLORIDE, AND CALCIUM CHLORIDE: .6; .31; .03; .02 INJECTION, SOLUTION INTRAVENOUS at 12:55

## 2019-11-14 RX ADMIN — FENTANYL CITRATE 25 MCG: 50 INJECTION, SOLUTION INTRAMUSCULAR; INTRAVENOUS at 13:48

## 2019-11-14 RX ADMIN — ONDANSETRON 4 MG: 2 INJECTION INTRAMUSCULAR; INTRAVENOUS at 13:06

## 2019-11-14 RX ADMIN — FUROSEMIDE 10 MG: 10 INJECTION, SOLUTION INTRAMUSCULAR; INTRAVENOUS at 14:09

## 2019-11-14 RX ADMIN — LIDOCAINE HYDROCHLORIDE 50 MG: 10 INJECTION, SOLUTION EPIDURAL; INFILTRATION; INTRACAUDAL; PERINEURAL at 12:59

## 2019-11-14 RX ADMIN — FENTANYL CITRATE 25 MCG: 50 INJECTION, SOLUTION INTRAMUSCULAR; INTRAVENOUS at 13:45

## 2019-11-14 NOTE — DISCHARGE INSTRUCTIONS
Ureteroscopy   WHAT YOU NEED TO KNOW:     Ida,    Today you had ureteroscopy with laser lithotripsy and stone basketing, in this procedure we broke your kidney stones and basketed out the fragments, you may continue to pass small fragments of stones, unfortunately we did have to leave the with a stent without a string given that we had to do an extensive amount of work to break in remove your stones  We will remove your stent in 3-4 weeks by way of a procedure in the office called cystoscopy and ureteral stent removal   This typically takes just a few minutes  After stent is removed we will get an x-ray and ultrasound to make sure that your kidney is draining well and has no swelling  After surgery such as today surgery it is normal to have urgency, frequency, and blood in your urine, as well as pain when you urinate  I am hopeful that these will not be severe for you  If you have questions or concerns in the postoperative time frame, please do not hesitate to contact our office  Take care and be well,  Dr Ulis Bence A ureteroscopy is a procedure to examine in the inside of your urinary tract, which includes your urethra, bladder, ureters, and kidneys  A ureteroscope is a small, thin tube with a light and camera on the end  Ureteroscopy can help your healthcare provider diagnose and treat problems in your urinary tract, such as kidney stones  DISCHARGE INSTRUCTIONS:   Medicine:   · Antibiotics  may be given to treat or prevent an infection  · Take your medicine as directed  Contact your healthcare provider if you think your medicine is not helping or if you have side effects  Tell him or her if you are allergic to any medicine  Keep a list of the medicines, vitamins, and herbs you take  Include the amounts, and when and why you take them  Bring the list or the pill bottles to follow-up visits  Carry your medicine list with you in case of an emergency    Follow up with your healthcare provider as directed:  Write down your questions so you remember to ask them during your visits  Drink liquids as directed  Liquids can help prevent kidney stones and urinary tract infections  Drink water and limit the amount of caffeine you drink  Caffeine may be found in coffee, tea, soda, sports drinks, and foods  Ask your healthcare provider how much liquid to drink each day  Contact your healthcare provider if:   · You have a fever  · You cannot urinate  · You have blood in your urine  · You are vomiting  · You have pain in your abdomen or side  · You have questions or concerns about your condition or care  © 2017 [employer] Information is for End User's use only and may not be sold, redistributed or otherwise used for commercial purposes  All illustrations and images included in CareNotes® are the copyrighted property of A D A M [employer] Inc  or [employer]  The above information is an  only  It is not intended as medical advice for individual conditions or treatments  Talk to your doctor, nurse or pharmacist before following any medical regimen to see if it is safe and effective for you

## 2019-11-14 NOTE — TELEPHONE ENCOUNTER
Patient is status post cystoscopy with ureteroscopy and stone basketing, has a new 6 [place] by 24 centimeter ureteral stent, the previous stent placed by Dr Shamika Huizar has been removed  We will schedule her for cystoscopy with ureteral stent removal in 3-4 weeks, she will then need a 3 month KUB and renal ultrasound which I have just now ordered

## 2019-11-14 NOTE — ANESTHESIA POSTPROCEDURE EVALUATION
Post-Op Assessment Note    Pain Score: 0    Pain management: adequate     Mental Status:  Sleepy   Hydration Status:  Stable   PONV Controlled:  None   Airway Patency:  Patent   Post Op Vitals Reviewed: Yes      Staff: Anesthesiologist, CRNA           /89 (11/14/19 1422)    Temp 97 8 °F (36 6 °C) (11/14/19 1422)    Pulse 72 (11/14/19 1422)   Resp 15 (11/14/19 1422)    SpO2 100 % (11/14/19 1422)

## 2019-11-14 NOTE — OP NOTE
OPERATIVE REPORT  PATIENT NAME: Vashti Weston    :  1965  MRN: [id number]  Pt Location: [facility] OR ROOM 04    SURGERY DATE: 2019    Surgeon(s) and Role:     * Jose Alberto Escamilla MD - Primary    Preop Diagnosis:  Calculus of ureter [N20 1]    Post-Op Diagnosis Codes:     * Calculus of ureter [N20 1]    Procedure(s) (LRB):  CYSTOSCOPY URETEROSCOPY WITH LITHOTRIPSY HOLMIUM LASER, RETROGRADE PYELOGRAM AND INSERTION STENT URETERAL (Left)  Please note, extensive stone basketing was also performed  Specimen(s):  ID Type Source Tests Collected by Time Destination   A : left ureteral and renal stone Calculus Ureter, Left STONE ANALYSIS Jose Alberto Escamilla MD 2019 1405        Estimated Blood Loss:   Minimal    Drains:  Urethral Catheter Straight-tip 20 Fr  (Active)   Number of days: 0       Ureteral Drain/Stent Left ureter 6 Fr  (Active)   Number of days: 0       Anesthesia Type:   General/LMA    Operative Indications:  Calculus of ureter [N20 1]  Distal ureteral stricture    Operative Findings:  Massive hydronephrosis proximal to the area of the iliac vessels, unable to place a rigid scope last the iliac vessels, a flexible scope was used, the previously noted obstructing stone was seen floating within the proximal ureter, this was passed into the kidney and subtle in the lower pole of the left kidney, 2 stones are noted, these were broken into small pieces and basketed free, at the end of the procedure there were no large, basket size fragments left, and a 6 [place] by 24 centimeter ureteral stent was then replaced without a string      The patient will require cystoscopy and ureteral stent removal in a number of weeks    Complications:   None    Procedure and Tech    PROCEDURES PERFORMED:  1) Cystoscopy  2) Left retrograde pyelography with fluoroscopic interpretation  3) Left ureteroscopy with laser lithotripsy and basket extraction of stone  4) Left ureteral stent placement (6F x 24cm    SURGEON:  Vasile Neri MD Matt    ASSISTANTS:  None    NOTE:  There were no qualified teaching residents to assist with this case    ANESTHESIA: General/LMA     COMPLICATIONS:   None    ANTIBIOTICS:  Cipro    INTRAOPERATIVE THROMBOEMBOLISM PROPHYLAXIS:  Pneumatic compression stockings         FINDINGS:    1  The Left calculus was not radiopaque on plain fluoroscopy  2  Retrograde pyelogram was performed on the Left side using a 5 Fr open ended catheter  5 of 50% dilute Isovue was injected  3  The following findings were noted: Moderate hydroureteronephrosis  Moderately dilated calyces  No filling defects  Minimal contrast drained out of the collecting system  INDICATIONS FOR PROCEDURE:  Humphrey Ley is an 52 y o  old female with Left ureteral and renal calculu, a stent had been previously placed by 1 of my colleagues     After discussing the options for treatment, including medical expulsive therapy, extracorporeal shockwave lithotripsy, and ureteroscopy, the patient elected to undergo ureteroscopy and ureteral stent placement  We discussed the procedure in detail, the alternatives, and the risks, and they signed informed consent to proceed (these are outlined in the surgical consent form)  PROCEDURE IN DETAIL:     The patient was identified by name, date of birth, and MRN  and brought to the OR  Antibiotic prophylaxis and DVT prophylaxis were administered as per the guidelines  They were placed in the dorsal lithotomy position with care to pad all pressure points  They were prepped and draped in the usual sterile fashion  A surgical time out was performed with all in the room in agreement with the correct patient, procedure, indications, and laterality  A 21-Bermudian rigid cystoscope was used to enter the bladder  The bladder was inspected in its entirety and there were no lesions noted  The ureteral orifices were identified in their normal orthotopic positions       The indwelling left stent was then delivered through the meatus, a wire was then placed  Retrograde pyelography was performed  A Solo wire was advanced up to the kidney under fluoroscopic guidance  Leaving this safety wire in place, the bladder was drained  A "7 5 Fr semi-rigid ureteroscope was advanced up the ureter under vision   This could only be passed to the level of the iliac vessels were for resistance at a ureteral stricture was felt  A second working wire was then placed and over a 5 3 [place] flexible scope was placed into the level the proximal ureter, the large, previously obstructing stone was seen there, and passed into the lower pole of the kidney  At this point a 2nd working wire was placed, a 12 14 access sheath was placed which was used to dilate the distal stricture with moderate difficulty, placement was verified visually and fluoroscopically of the ureteral access sheath  The obturator was removed, a digital flexible scope was then used to perform systematic pyeloscopy, this showed 2 stones within the lower pole calyx     The stone was not noted to be impacted  A holmium laser fiber was passed through the ureteroscope and laser lithotripsy was commenced at settings of 1 J and 10 Hz  The stones were fragmented to very small pieces and dust       Once we were satisfied that the stone was fragmented to dust, a 1 9 SpinPunchra zero tip nitinol basket was passed through the ureteroscope  The residual fragments were basketed out and removed  The ureteroscope was backed down the ureter under vision and there were no residual fragments and the ureter was noted to be intact with no injury and Significant edema where the stone was located  A 6 [place] by 24 centimeter left JJ stent was then passed up the wire  under fluoroscopic guidance into the kidney with a good curl noted in the kidney and in the bladder  The stent string was removed  The bladder was drained    All instrument counts and sponge counts were correct  The patient was placed back into the supine position, awakened from general anesthesia and brought to recovery room in stable condition  ESTIMATED BLOOD LOSS:  Minimal      DRAINS:   Urethral Catheter Straight-tip 20 Fr  (Active)       Ureteral Drain/Stent Left ureter 6 Fr  (Active)       SPECIMENS:   Order Name Source Comment Collection Info Order Time   STONE ANALYSIS Ureter, Left  Collected By: Emma Hernandez MD 11/14/2019  2:05 PM        IMPLANTS:   Implant Name Type Inv  Item Serial No   Lot No  LRB No  Used   URETERAL STENT 6 FR X 24 CM OPTIMA INLAY - [id number]  URETERAL STENT 6 FR X 24 CM OPTIMA INLAY  [employer] [id number] Left 1        COMPLICATIONS:  None    DISPOSITION: PACU     PLAN:  Patient will require cystoscopy and ureteral stent removal in 3-4 weeks, she will then get a KUB and renal ultrasound 3 months later         I was present for the entire procedure and A qualified resident physician was not available    Patient Disposition:  PACU     SIGNATURE: Emma Hernandez MD  DATE: November 14, 2019  TIME: 2:20 PM

## 2019-11-14 NOTE — H&P
H&P Exam - Urology   Tricia Scott 52 y o  female MRN: [id number]  Unit/Bed#: OR [place] Encounter: [id number]    Assessment/Plan     Assessment:  52year old with left nephrolithiasis s/p stent here for left urs and laser litho, marked on left arm, films reviewed, consent signed, ready for surgeyr  Plan:  Proceed to left urs and laser lithotripsy    History of Present Illness   HPI:  Tricia Scott is a 52 y o  female who presents with indwelling left stent and flank pain, here for definitive stone management  All questions and concerns answered and addressed  Review of Systems   Constitutional: Negative  HENT: Negative  Eyes: Negative  Respiratory: Negative  Cardiovascular: Negative  Gastrointestinal: Negative  Endocrine: Negative  Genitourinary: Positive for dysuria and flank pain  Skin: Negative  Allergic/Immunologic: Negative  Neurological: Negative  Hematological: Negative  Psychiatric/Behavioral: Negative  Historical Information   Past Medical History:   Diagnosis Date    [facility] [address] [facility]     Cancer [facility] [address] [facility]     Diabetes mellitus ([facility] [address] [facility]     Hypertension     Shingles      Past Surgical History:   Procedure Laterality Date    CARDIAC DEFIBRILLATOR PLACEMENT      CHOLECYSTECTOMY      FL RETROGRADE PYELOGRAM  9/14/2019    PA CYSTOURETHROSCOPY,URETER CATHETER Left 9/14/2019    Procedure: CYSTOSCOPY RETROGRADE PYELOGRAM WITH INSERTION STENT URETERAL;  Surgeon: Hina Angela MD;  Location: [facility];  Service: Urology     Social History   Social History     Substance and Sexual Activity   Alcohol Use Never    Frequency: Never     Social History     Substance and Sexual Activity   Drug Use Never     Social History     Tobacco Use   Smoking Status Current Every Day Smoker    Packs/day: 0 50   Smokeless Tobacco Never Used     Family History: History reviewed  No pertinent family history      Meds/Allergies   all medications and allergies reviewed  Allergies Allergen Reactions    Penicillins Hives       Objective   Vitals: Blood pressure 136/94, pulse 77, temperature (!) 97 °F (36 1 °C), temperature source Temporal, resp  rate 20, height 5' (1 524 m), weight 63 1 kg (139 lb 1 8 oz), SpO2 98 %  No intake/output data recorded  Invasive Devices     Peripheral Intravenous Line            Peripheral IV 11/14/19 Right Hand less than 1 day          Drain            Ureteral Drain/Stent Left ureter 6 Fr  61 days                Physical Exam   Constitutional: She is oriented to person, place, and time  She appears well-developed and well-nourished  No distress  HENT:   Head: Normocephalic and atraumatic  Nose: Nose normal    Mouth/Throat: Oropharynx is clear and moist  No oropharyngeal exudate  Eyes: Right eye exhibits no discharge  Left eye exhibits no discharge  Neck: No tracheal deviation present  Cardiovascular: Normal rate, regular rhythm and intact distal pulses  Pulmonary/Chest: Effort normal  No stridor  No respiratory distress  Abdominal: Soft  She exhibits no distension and no mass  There is no tenderness  There is no rebound and no guarding  No hernia  Genitourinary:   Genitourinary Comments: Deferred for OR   Musculoskeletal: She exhibits no edema, tenderness or deformity  Neurological: She is alert and oriented to person, place, and time  No cranial nerve deficit  Coordination normal    Skin: Skin is warm and dry  No rash noted  She is not diaphoretic  No erythema  No pallor  Psychiatric: She has a normal mood and affect  Her behavior is normal  Judgment and thought content normal    Nursing note and vitals reviewed  Lab Results: I have personally reviewed pertinent reports  Imaging: I have personally reviewed pertinent reports  EKG, Pathology, and Other Studies: I have personally reviewed pertinent reports      VTE Prophylaxis: Sequential compression device Yvone Loupe)     Code Status: Prior  Advance Directive and Living Will: Power of :    POLST:      Counseling / Coordination of Care  Total floor / unit time spent today 15 minutes  Greater than 50% of total time was spent with the patient and / or family counseling and / or coordination of care  A description of the counseling / coordination of care: review of pre, shayy, and post op care

## 2019-11-15 NOTE — ANESTHESIA PREPROCEDURE EVALUATION
Review of Systems/Medical History  Patient summary reviewed  Chart reviewed      Cardiovascular  EKG reviewed, Exercise tolerance (METS): >4,  Pacemaker/AICD, Hyperlipidemia, Hypertension , CHF ,   Comment: NICM per notes,  Pulmonary       GI/Hepatic       Kidney stones,        Endo/Other  Diabetes ,      GYN       Hematology   Musculoskeletal       Neurology   Psychology           Physical Exam    Airway    Mallampati score: II  TM Distance: >3 FB       Dental   No notable dental hx     Cardiovascular  Rhythm: regular, Rate: normal,     Pulmonary  Pulmonary exam normal     Other Findings        Anesthesia Plan  ASA Score- 2     Anesthesia Type- general with ASA Monitors  Additional Monitors:   Airway Plan: LMA  Plan Factors-Patient not instructed to abstain from smoking on day of procedure  Patient did not smoke on day of surgery  Induction- intravenous  Postoperative Plan-     Informed Consent- Anesthetic plan and risks discussed with patient  I personally reviewed this patient with the CRNA  Discussed and agreed on the Anesthesia Plan with the CRNA  Zee Wharton

## 2019-11-18 NOTE — TELEPHONE ENCOUNTER
Called and spoke to patient  Patient is scheduled for a cysto stent removal with Pearl Triplett PA-C on 12/4/19 at 11:15 at the [facility] office  Post Op Note    Dorina Durand is a 52 y o  female s/p CYSTOSCOPY URETEROSCOPY WITH LITHOTRIPSY HOLMIUM LASER, RETROGRADE PYELOGRAM AND INSERTION STENT URETERAL (Left Bladder) performed 11/18/19  Dorina Durand is a patient of Dr Deuce Troy and is seen at the [facility] office  How would you rate your pain on a scale from 1 to 10, 10 being the worst pain ever? 1  Have you had a fever? No  If so, what was your highest temperature? n/a F What is your current temperature? n/a F  Have your bowel movements been regular? Yes  Do you have any difficulty urinating? No  If the patient has an incision- do you have any redness around the incision or any drainage from the incision? N/a  If the patient has a drain- are you having any issues with the drain? N/a  If the patient has a murillo- are you comfortable caring for your murillo? N/a Is it draining urine? N/a  Do you have any other questions or concerns that I can address at this time?  No

## 2019-11-22 LAB
COLOR STONE: NORMAL
COMMENT-STONE3: NORMAL
COMPOSITION: NORMAL
LABORATORY COMMENT REPORT: NORMAL
NIDUS STONE QL: NORMAL
PHOTO: NORMAL
SIZE STONE: NORMAL MM
STONE ANALYSIS-IMP: NORMAL
STONE ANALYSIS-IMP: NORMAL
URATE MFR STONE: 100 %
WT STONE: 71.7 MG

## 2019-12-03 NOTE — TELEPHONE ENCOUNTER
Patient left message requesting to reschedule CYSTO/STENT REMOVAL Appointment to next week       Asking for a call back to discuss at [phone]

## 2019-12-03 NOTE — TELEPHONE ENCOUNTER
Called and spoke to patient  Patient rescheduled for Monday 12/9/19 at 11:30 at the [facility] office for stent removal with Ap

## 2019-12-06 ENCOUNTER — TELEPHONE (OUTPATIENT)
Dept: UROLOGY | Facility: CLINIC | Age: 54
End: 2019-12-06

## 2019-12-06 ENCOUNTER — TELEPHONE (OUTPATIENT)
Dept: UROLOGY | Facility: AMBULATORY SURGERY CENTER | Age: 54
End: 2019-12-06

## 2019-12-06 NOTE — TELEPHONE ENCOUNTER
Called and left message for patient to call the office back to reschedule appointment  Office number was left in the message  When patient calls back please rescheduled cysto stent removal with Dustin Iyer or Sandra Vaz

## 2019-12-06 NOTE — TELEPHONE ENCOUNTER
This cysto stent removal needs to be rescheduled  There are no other providers in the office with me that day

## 2019-12-16 ENCOUNTER — PROCEDURE VISIT (OUTPATIENT)
Dept: UROLOGY | Facility: CLINIC | Age: 54
End: 2019-12-16
Payer: COMMERCIAL

## 2019-12-16 VITALS
DIASTOLIC BLOOD PRESSURE: 80 MMHG | HEIGHT: 60 IN | SYSTOLIC BLOOD PRESSURE: 132 MMHG | WEIGHT: 138.6 LBS | HEART RATE: 84 BPM | BODY MASS INDEX: 27.21 KG/M2

## 2019-12-16 DIAGNOSIS — N20.0 NEPHROLITHIASIS: Primary | ICD-10-CM

## 2019-12-16 LAB
SL AMB  POCT GLUCOSE, UA: NORMAL
SL AMB LEUKOCYTE ESTERASE,UA: NORMAL
SL AMB POCT BILIRUBIN,UA: NORMAL
SL AMB POCT BLOOD,UA: NORMAL
SL AMB POCT CLARITY,UA: CLEAR
SL AMB POCT COLOR,UA: YELLOW
SL AMB POCT KETONES,UA: NORMAL
SL AMB POCT NITRITE,UA: NORMAL
SL AMB POCT PH,UA: 6.5
SL AMB POCT SPECIFIC GRAVITY,UA: 1.01
SL AMB POCT URINE PROTEIN: NORMAL
SL AMB POCT UROBILINOGEN: NORMAL

## 2019-12-16 PROCEDURE — 81002 URINALYSIS NONAUTO W/O SCOPE: CPT | Performed by: PHYSICIAN ASSISTANT

## 2019-12-16 PROCEDURE — 52310 CYSTOSCOPY AND TREATMENT: CPT | Performed by: PHYSICIAN ASSISTANT

## 2019-12-16 RX ORDER — CIPROFLOXACIN 500 MG/1
500 TABLET, FILM COATED ORAL EVERY 12 HOURS SCHEDULED
Qty: 6 TABLET | Refills: 0 | Status: SHIPPED | OUTPATIENT
Start: 2019-12-16 | End: 2019-12-19

## 2019-12-16 NOTE — PROGRESS NOTES
Cystoscopy  Date/Time: 12/16/2019 11:27 AM  Performed by: Glen Sales PA-C  Authorized by: Glen Sales PA-C     Procedure details: unilateral ureteral stent    Patient tolerance: Patient tolerated the procedure well with no immediate complications    Additional Procedure Details: The patient returns to the office today to undergo cystoscopy with left ureteral stent removal  Risk and benefits of the procedure were discussed and informed consent was obtained  The patient was placed in the modified supine position  The genitalia were prepped and draped in a sterile fashion  Viscous lidocaine was used for local anesthesia  The flexible cystoscope was passed  The bladder was inspected  The stent was identified coming from the left ureteral orifice  The stent was grasped with a flexible grasper and was then removed in its entirety without complications  Overall the patient tolerated the procedure  The patient was provided with a 3 day prescription of ciprofloxacin for infection prophylaxis following the procedure  They were made aware to advise our office of any fevers greater than 101 degrees Fahrenheit, malaise, or chills  They were advised that it is normal to have cramping pain on the ipsilateral side for a day or so after ureteral stent removal   They are to remain well hydrated in the coming days

## 2020-01-14 DIAGNOSIS — N20.1 URETEROLITHIASIS: ICD-10-CM

## 2020-01-14 RX ORDER — OXYBUTYNIN CHLORIDE 5 MG/1
5 TABLET ORAL 2 TIMES DAILY PRN
Qty: 60 TABLET | Refills: 0 | OUTPATIENT
Start: 2020-01-14 | End: 2020-02-13

## 2020-02-12 ENCOUNTER — TELEPHONE (OUTPATIENT)
Dept: UROLOGY | Facility: MEDICAL CENTER | Age: 55
End: 2020-02-12

## 2020-02-12 NOTE — TELEPHONE ENCOUNTER
Called and spoke to patient  Patient rescheduled for next available follow up after US on 3/23/20  Patient is aware that once US is done appointment can be moved up at that time if needed  Patient verbalized understanding and denies any other questions or concerns

## 2020-02-12 NOTE — TELEPHONE ENCOUNTER
Pt managed [place] she's called to reschedule 02/21/20 appointment with [facility] due to U/S was rescheduled to 02/28/20 I was unable to accommodate with follow up appointment,please review for time frame appointment

## 2020-02-28 ENCOUNTER — HOSPITAL ENCOUNTER (OUTPATIENT)
Dept: ULTRASOUND IMAGING | Facility: HOSPITAL | Age: 55
Discharge: HOME | End: 2020-02-28
Attending: UROLOGY
Payer: COMMERCIAL

## 2020-02-28 DIAGNOSIS — N20.0 NEPHROLITHIASIS: ICD-10-CM

## 2020-02-28 PROCEDURE — 76770 US EXAM ABDO BACK WALL COMP: CPT

## 2020-03-18 DIAGNOSIS — N20.0 NEPHROLITHIASIS: Primary | ICD-10-CM

## 2020-03-19 ENCOUNTER — TELEPHONE (OUTPATIENT)
Dept: UROLOGY | Facility: CLINIC | Age: 55
End: 2020-03-19

## 2020-03-19 NOTE — TELEPHONE ENCOUNTER
I left message to inform patient that her ultrasound came back negative and her appointment for 03/23/20 could be rescheduled until next year due to the COVID-19 outbreak  I informed patient to give our office a call back to reschedule appointment  When patient calls back to reschedule appointment also inform her that she will need a renal ultrasound prior to her visit next year

## 2020-03-19 NOTE — TELEPHONE ENCOUNTER
Please let the patient know that her ultrasound is negative for concerning findings  She does continue to have stones which are not causing a problem at this time  If she has further questions, please let me know  She can be rescheduled from Monday [phone] year from now with an ultrasound prior to visit  Order for ultrasound has been placed

## 2020-03-23 NOTE — TELEPHONE ENCOUNTER
Called pt lm that appt was r/s for 3/23/2021 and to go for US 1-2 week prior to the visit  Order was mailed to pt to schedule

## 2021-04-29 ENCOUNTER — TELEPHONE (OUTPATIENT)
Dept: UROLOGY | Facility: CLINIC | Age: 56
End: 2021-04-29

## 2021-04-29 NOTE — TELEPHONE ENCOUNTER
PT NOW HAS [employer]IC[employer] HMO [facility] FACILITIES ARE NOT IN NETWORK WITH THIS INS  PT AWARE OK TO CANCEL

## 2022-05-05 ENCOUNTER — APPOINTMENT (OUTPATIENT)
Dept: LAB | Facility: CLINIC | Age: 57
End: 2022-05-05
Payer: MEDICARE

## 2022-05-05 DIAGNOSIS — E11.9 DIABETES MELLITUS WITHOUT COMPLICATION (HCC): ICD-10-CM

## 2022-05-05 LAB
ALBUMIN SERPL BCP-MCNC: 3.6 G/DL (ref 3.5–5)
ALP SERPL-CCNC: 47 U/L (ref 46–116)
ALT SERPL W P-5'-P-CCNC: 58 U/L (ref 12–78)
ANION GAP SERPL CALCULATED.3IONS-SCNC: 4 MMOL/L (ref 4–13)
AST SERPL W P-5'-P-CCNC: 29 U/L (ref 5–45)
BASOPHILS # BLD AUTO: 0.04 THOUSANDS/ΜL (ref 0–0.1)
BASOPHILS NFR BLD AUTO: 1 % (ref 0–1)
BILIRUB SERPL-MCNC: 0.46 MG/DL (ref 0.2–1)
BUN SERPL-MCNC: 11 MG/DL (ref 5–25)
CALCIUM SERPL-MCNC: 9.7 MG/DL (ref 8.3–10.1)
CHLORIDE SERPL-SCNC: 106 MMOL/L (ref 100–108)
CHOLEST SERPL-MCNC: 208 MG/DL
CO2 SERPL-SCNC: 30 MMOL/L (ref 21–32)
CREAT SERPL-MCNC: 0.73 MG/DL (ref 0.6–1.3)
EOSINOPHIL # BLD AUTO: 0.38 THOUSAND/ΜL (ref 0–0.61)
EOSINOPHIL NFR BLD AUTO: 7 % (ref 0–6)
ERYTHROCYTE [DISTWIDTH] IN BLOOD BY AUTOMATED COUNT: 12.6 % (ref 11.6–15.1)
EST. AVERAGE GLUCOSE BLD GHB EST-MCNC: 126 MG/DL
GFR SERPL CREATININE-BSD FRML MDRD: 91 ML/MIN/1.73SQ M
GLUCOSE P FAST SERPL-MCNC: 122 MG/DL (ref 65–99)
HBA1C MFR BLD: 6 %
HCT VFR BLD AUTO: 48 % (ref 34.8–46.1)
HDLC SERPL-MCNC: 43 MG/DL
HGB BLD-MCNC: 15.6 G/DL (ref 11.5–15.4)
IMM GRANULOCYTES # BLD AUTO: 0.02 THOUSAND/UL (ref 0–0.2)
IMM GRANULOCYTES NFR BLD AUTO: 0 % (ref 0–2)
LDLC SERPL CALC-MCNC: 119 MG/DL (ref 0–100)
LYMPHOCYTES # BLD AUTO: 1.11 THOUSANDS/ΜL (ref 0.6–4.47)
LYMPHOCYTES NFR BLD AUTO: 20 % (ref 14–44)
MCH RBC QN AUTO: 30.8 PG (ref 26.8–34.3)
MCHC RBC AUTO-ENTMCNC: 32.5 G/DL (ref 31.4–37.4)
MCV RBC AUTO: 95 FL (ref 82–98)
MONOCYTES # BLD AUTO: 0.32 THOUSAND/ΜL (ref 0.17–1.22)
MONOCYTES NFR BLD AUTO: 6 % (ref 4–12)
NEUTROPHILS # BLD AUTO: 3.75 THOUSANDS/ΜL (ref 1.85–7.62)
NEUTS SEG NFR BLD AUTO: 66 % (ref 43–75)
NONHDLC SERPL-MCNC: 165 MG/DL
NRBC BLD AUTO-RTO: 0 /100 WBCS
PLATELET # BLD AUTO: 208 THOUSANDS/UL (ref 149–390)
PMV BLD AUTO: 9.9 FL (ref 8.9–12.7)
POTASSIUM SERPL-SCNC: 4.1 MMOL/L (ref 3.5–5.3)
PROT SERPL-MCNC: 6.9 G/DL (ref 6.4–8.2)
RBC # BLD AUTO: 5.07 MILLION/UL (ref 3.81–5.12)
SODIUM SERPL-SCNC: 140 MMOL/L (ref 136–145)
TRIGL SERPL-MCNC: 232 MG/DL
TSH SERPL DL<=0.05 MIU/L-ACNC: 2.61 UIU/ML (ref 0.45–4.5)
WBC # BLD AUTO: 5.62 THOUSAND/UL (ref 4.31–10.16)

## 2022-05-05 PROCEDURE — 80053 COMPREHEN METABOLIC PANEL: CPT

## 2022-05-05 PROCEDURE — 36415 COLL VENOUS BLD VENIPUNCTURE: CPT

## 2022-05-05 PROCEDURE — 84443 ASSAY THYROID STIM HORMONE: CPT

## 2022-05-05 PROCEDURE — 85025 COMPLETE CBC W/AUTO DIFF WBC: CPT

## 2022-05-05 PROCEDURE — 83036 HEMOGLOBIN GLYCOSYLATED A1C: CPT

## 2022-05-05 PROCEDURE — 80061 LIPID PANEL: CPT

## 2024-06-07 NOTE — PROGRESS NOTES
Jp complete. No post ordered.   [facility] Progress Note  Patient: Kristine Malin 52 y o  female   MRN: [id number]  PCP: Antonette Almanzar MD  Unit/Bed#: -[facility] Encounter: [id number]  Date Of Visit: 09/15/19          * Pyelonephritis  Assessment & Plan  Status post stent placement  Pyelonephritis associated with stone  On IV antibiotic  Continue with current treatment  Follow up with final cultures  The patient follow with Urology on outpatient basis as well  Atrial fibrillation [facility])  Assessment & Plan  History of atrial fibrillation  Not on any anticoagulation chronically  Continue with Coreg  Would check an EKG    Hypertension  Assessment & Plan  Patient is on Losartan, Spironolactone, Carvedilol, Lasix  Hold all BP Meds for SBP < 110    Hydronephrosis concurrent with and due to calculi of kidney and ureter  Assessment & Plan  Status post stent placement as above    Type 2 diabetes mellitus, without long-term current use of insulin [facility])  Assessment & Plan  No results found for: HGBA1C    Recent Labs     09/14/19  1647 09/14/19  2041 09/15/19  0500 09/15/19  1049   POCGLU 93 165* 121 102       Blood Sugar Average: Last 72 hrs:  (P) 114 5     Check hemoglobin A1c if already not done  Continue with current treatment  Accu-Cheks fair    Ureterolithiasis  Assessment & Plan  Plan as above      Present on Admission:   Ureterolithiasis   CHF (congestive heart failure) (HCC)   Type 2 diabetes mellitus, without long-term current use of insulin (HCC)   Hydronephrosis concurrent with and due to calculi of kidney and ureter   Hypertension   Atrial fibrillation (HCC)            VTE Pharmacologic Prophylaxis:   Pharmacologic: Heparin  Mechanical VTE Prophylaxis in Place: Yes    Patient Centered Rounds: I have performed bedside rounds with nursing staff today  Discussions with Specialists or Other Care Team Provider:  Yes    Education and Discussions with Family / Patient:  Yes    Time Spent for Care: 35+ minutes  More than 50% of total time spent on counseling and coordination of care as described above  Current Length of Stay: 1 day(s)    Current Patient Status: Inpatient   Certification Statement: The patient will continue to require additional inpatient hospital stay due to IV antibiotics    Discharge Plan:  Hopefully home soon    Code Status: Level 1 - Full Code      Subjective:   She feels pretty good today  She came in with left flank pain  Denies any fever or chills today  Also she is not complaining of any significant pain today either  No chest pain or shortness of breath  Eating well  Objective:     Vitals:   Temp (24hrs), Av 1 °F (37 3 °C), Min:98 2 °F (36 8 °C), Max:100 5 °F (38 1 °C)    Temp:  [98 2 °F (36 8 °C)-100 5 °F (38 1 °C)] 98 5 °F (36 9 °C)  HR:  [] 90  Resp:  [17-18] 17  BP: ()/(66-70) 97/66  SpO2:  [94 %-95 %] 94 %  Body mass index is 28 42 kg/m²  Input and Output Summary (last 24 hours): Intake/Output Summary (Last 24 hours) at 9/15/2019 1438  Last data filed at 9/15/2019 1325  Gross per 24 hour   Intake 3083 75 ml   Output 2830 ml   Net 253 75 ml           Physical Exam:     Vital signs are reviewed as above  Constitutional:   Up in bed not in any respiratory distress  Eyes: EOM grossly intact  Conjunctivae slightly pale  Patient has anicteric sclera  HENT: Oropharynx are moist    Neck: Neck is supple  Cardiac: I did not hear any rubs or gallop  Patient appears to be in sinus rhythm  Respiratory: Patient not in significant respiratory distress  Air entry in general is fair  GI: Abdomen is soft  It is grossly nontender  Bowel sounds are adequate  I was not able to appreciate any hepatosplenomegaly  Neurologic:  Patient is awake and alert  Neurological examination is grossly intact  No obvious focal neurological deficit noticed  Skin: Skin is warm and dry  Psychiatric: Mood and affect are pleasant  Musculoskeletal  Patient moving all extremities   Patient has Fritz catheter in draining very much clear urine  Extremities: Patient has no significant cyanosis, clubbing, or lower extremity edema       Additional Data:     Labs:    Results from last 7 days   Lab Units 09/15/19  0453  09/14/19  0658   WBC Thousand/uL 5 34  --  9 69   HEMOGLOBIN g/dL 11 4*  --  13 8   HEMATOCRIT % 34 6*  --  41 3   PLATELETS Thousands/uL 188   < > 240   NEUTROS PCT %  --   --  87*   LYMPHS PCT %  --   --  7*   LYMPHO PCT % 9*  --   --    MONOS PCT %  --   --  4   MONO PCT % 4  --   --    EOS PCT % 1  --  2    < > = values in this interval not displayed  Results from last 7 days   Lab Units 09/15/19  0453  09/14/19  0658   POTASSIUM mmol/L 3 5   < > 3 3*   CHLORIDE mmol/L 100   < > 96*   CO2 mmol/L 26   < > 27   BUN mg/dL 12   < > 15   CREATININE mg/dL 0 98   < > 1 21   CALCIUM mg/dL 8 9   < > 9 3   ALK PHOS U/L  --   --  54   ALT U/L  --   --  15   AST U/L  --   --  10    < > = values in this interval not displayed  Results from last 7 days   Lab Units 09/14/19  0829   INR  1 13       * I Have Reviewed All Lab Data Listed Above  * Additional Pertinent Lab Tests Reviewed: All Labs Within Last 24 Hours Reviewed      Recent Cultures (last 7 days):     Results from last 7 days   Lab Units 09/14/19  0949 09/14/19  0845 09/14/19  0829 09/14/19  0739   BLOOD CULTURE   --  No Growth at 24 hrs   No Growth at 24 hrs   --    URINE CULTURE  50,000-59,000 cfu/ml Escherichia coli*  --   --  >100,000 cfu/ml Escherichia coli*       Last 24 Hours Medication List:     Current Facility-Administered Medications:  acetaminophen 650 mg Oral Q6H PRN Marian Causey MD    aspirin 81 mg Oral Daily Marian Causey MD    atorvastatin 10 mg Oral Daily Marian Causey MD    carvedilol 6 25 mg Oral BID With Meals Marian Causey MD    cefepime 2,000 mg Intravenous Q12H Marian Causey MD Last Rate: 2,000 mg (09/15/19 0845)   fenofibrate 145 mg Oral Daily Marian Causey MD    furosemide 40 mg Oral Daily Marian Causey MD heparin (porcine) 5,000 Units Subcutaneous Q8H [address] Sheri Craft MD    insulin lispro 1-5 Units Subcutaneous TID AC Sheri Craft MD    losartan 100 mg Oral Daily Sheri Craft MD    morphine injection 2 mg Intravenous Q6H PRN Sheri Craft MD    nicotine 1 patch Transdermal Daily Sheri Craft MD    pantoprazole 40 mg Oral Daily Sheri Craft MD    spironolactone 25 mg Oral Daily Sheri Craft MD         Today, Patient Was Seen By: Norm Fernandez MD    ** Please Note: [employer] Dictation voice to text software may have been used in the creation of this document   **

## (undated) DEVICE — GLOVE INDICATOR PI UNDERGLOVE SZ 8 BLUE

## (undated) DEVICE — GLOVE SRG BIOGEL 7

## (undated) DEVICE — LUBRICANT SURGILUBE TUBE 4 OZ  FLIP TOP

## (undated) DEVICE — CHLORHEXIDINE 4PCT 4 OZ

## (undated) DEVICE — SHEATH URETERAL ACCESS 12/14FR 35CM PROXIS

## (undated) DEVICE — CATH FOLEY 20FR 5ML 2 WAY SILICONE ELASTIMER

## (undated) DEVICE — PACK TUR

## (undated) DEVICE — SPONGE 4 X 4 XRAY 16 PLY STRL LF RFD

## (undated) DEVICE — 3M™ TEGADERM™ TRANSPARENT FILM DRESSING FRAME STYLE, 1624W, 2-3/8 IN X 2-3/4 IN (6 CM X 7 CM), 100/CT 4CT/CASE: Brand: 3M™ TEGADERM™

## (undated) DEVICE — CATH URETERAL 5FR X 70 CM FLEX TIP POLYUR BARD

## (undated) DEVICE — LASER FIBER HOLMIUM 272MICRON

## (undated) DEVICE — INVIEW CLEAR LEGGINGS: Brand: CONVERTORS

## (undated) DEVICE — UROCATCH BAG

## (undated) DEVICE — SCD SEQUENTIAL COMPRESSION COMFORT SLEEVE MEDIUM KNEE LENGTH: Brand: KENDALL SCD

## (undated) DEVICE — GUIDEWIRE STRGHT TIP 0.035 IN  SOLO PLUS

## (undated) DEVICE — BASKET SPECIMEN RETRIVAL 1.9FR 120CM

## (undated) DEVICE — GLOVE SRG BIOGEL ECLIPSE 7.5